# Patient Record
Sex: FEMALE | Race: WHITE | Employment: FULL TIME | URBAN - NONMETROPOLITAN AREA
[De-identification: names, ages, dates, MRNs, and addresses within clinical notes are randomized per-mention and may not be internally consistent; named-entity substitution may affect disease eponyms.]

---

## 2017-01-02 ENCOUNTER — HISTORY (OUTPATIENT)
Dept: FAMILY MEDICINE | Facility: OTHER | Age: 32
End: 2017-01-02

## 2017-01-02 ENCOUNTER — OFFICE VISIT - GICH (OUTPATIENT)
Dept: FAMILY MEDICINE | Facility: OTHER | Age: 32
End: 2017-01-02

## 2017-01-02 DIAGNOSIS — J06.9 ACUTE UPPER RESPIRATORY INFECTION: ICD-10-CM

## 2017-01-02 DIAGNOSIS — J30.89 OTHER ALLERGIC RHINITIS: ICD-10-CM

## 2017-01-02 DIAGNOSIS — B97.89 OTHER VIRAL AGENTS AS THE CAUSE OF DISEASES CLASSIFIED ELSEWHERE: ICD-10-CM

## 2017-01-02 DIAGNOSIS — R51.9 HEADACHE: ICD-10-CM

## 2017-01-02 DIAGNOSIS — J02.9 ACUTE PHARYNGITIS: ICD-10-CM

## 2017-01-02 DIAGNOSIS — H93.8X3 OTHER SPECIFIED DISORDERS OF EAR, BILATERAL: ICD-10-CM

## 2017-01-02 ASSESSMENT — PATIENT HEALTH QUESTIONNAIRE - PHQ9: SUM OF ALL RESPONSES TO PHQ QUESTIONS 1-9: 11

## 2017-03-10 ENCOUNTER — HISTORY (OUTPATIENT)
Dept: FAMILY MEDICINE | Facility: OTHER | Age: 32
End: 2017-03-10

## 2017-03-10 ENCOUNTER — OFFICE VISIT - GICH (OUTPATIENT)
Dept: FAMILY MEDICINE | Facility: OTHER | Age: 32
End: 2017-03-10

## 2017-03-10 DIAGNOSIS — B97.89 OTHER VIRAL AGENTS AS THE CAUSE OF DISEASES CLASSIFIED ELSEWHERE: ICD-10-CM

## 2017-03-10 DIAGNOSIS — J98.8 OTHER SPECIFIED RESPIRATORY DISORDERS: ICD-10-CM

## 2018-01-02 NOTE — PATIENT INSTRUCTIONS
Patient Information     Patient Name MRN Sex Bean Maldonado [id number] Female 1985      Patient Instructions by Jeannette Jackson NP at 2017 10:15 AM     Author:  Jeannette Jackson NP Service:  (none) Author Type:  PHYS- Nurse Practitioner     Filed:  2017 10:45 AM Encounter Date:  2017 Status:  Signed     :  Jeannette Jackson NP (PHYS- Nurse Practitioner)            Toradol injection administered in clinic for pain    Decadron given in clinic for swelling, pain    Continue to use Albuterol inhaler as needed    Renewed Singulair    Robitussin with codeine twice daily as needed for cough    Tessalon up to 3 times per day as needed for cough    Symptoms likely due to virus. No antibiotic is needed at this time.     Symptoms typically worse on days 3-4 and then begin improving each day. If symptoms begin worsening or fail to improve after 10 - 14 days, return to clinic for reevaluation.     Encouraged fluids and rest.    May use symptomatic care with tylenol or ibuprofen.     Using a humidifier works well to break up the congestion.     Elevate the mattress to 15 degrees in order to help with the congestion.    Frequent swallows of cool liquid.      Oatmeal or honey coats the throat and some patients find it soothes the pain.     Please call clinic with any questions or concerns.     Return to clinic with change/worsening of symptoms or concerns.

## 2018-01-02 NOTE — NURSING NOTE
Patient Information     Patient Name MRN Bean Shell [id number] Female 1985      Nursing Note by Wyatt Bae at 2017 10:15 AM     Author:  yWatt Bae Service:  (none) Author Type:  (none)     Filed:  2017 10:20 AM Encounter Date:  2017 Status:  Signed     :  Wyatt Bae            Patient here with bad cough.  Coughs so hard she'll vomit.  Also headache and sore throat from coughing.  Wyatt Bae CMA..............2017   10:16 AM

## 2018-01-02 NOTE — PROGRESS NOTES
Patient Information     Patient Name MRN Sex Bena Maldonado [id number] Female 1985      Progress Notes by Jeannette Jackson NP at 2017 10:15 AM     Author:  Jeannette Jackson NP Service:  (none) Author Type:  PHYS- Nurse Practitioner     Filed:  2017 12:05 PM Encounter Date:  2017 Status:  Signed     :  Jeannette Jackson NP (PHYS- Nurse Practitioner)            HPI:    Bean Garcia is a 31 y.o. female who presents to clinic today for cough.  Cough for the past 6 days.  Coughing up phlegm, hard yellow.  Hard coughing spells.  Cough is day and night.  Mild chest congestion.  No chest tightness.  No shortness of breath.  Stuffy nose.  Minimal drainage.  Some post nasal drainage.  Sore throat for the past 6 days, worsening.  Painful to swallow and talk.  Constant dizziness for the past 6 days.  States it does not go away and worse with movements.  No falls.  No syncopal episodes.  Bilateral ears with pressure.  Persistent headaches for the past 6 days.  Headache pain is across the forehead, achy pain.  No fevers.  Chills and sweats today.  Mild body aches.  Appetite normal.  Energy decreased.  Taking OTC cough medication, Ibuprofen and using cough drops.  No Ibuprofen today.  Occasional use of Albuterol inhaler.            No past medical history on file.  No past surgical history on file.  Social History     Substance Use Topics       Smoking status: Never Smoker     Smokeless tobacco: Never Used     Alcohol use Not on file     Current Outpatient Prescriptions       Medication  Sig Dispense Refill     montelukast (SINGULAIR) 10 mg tablet        VENTOLIN HFA 90 mcg/actuation inhaler        No current facility-administered medications for this visit.      Medications have been reviewed by me and are current to the best of my knowledge and ability.    Allergies     Allergen  Reactions     Amoxicillin Rash and Edema       ROS:  Refer to HPI    Visit Vitals       /88     Pulse 84  "    Temp 98.5  F (36.9  C) (Tympanic)     Ht 1.651 m (5' 5\")     Wt 87.5 kg (193 lb)     LMP 12/15/2016 (Approximate)     BMI 32.12 kg/m2       EXAM:  General Appearance: Well appearing adult female, appropriate appearance for age. No acute distress  Head: normocephalic, atraumatic  Ears: Left TM with bony landmarks appreciated, no erythema, no effusion, no bulging, no purulence.  Right TM with bony landmarks appreciated, no erythema, no effusion, no bulging, no purulence.   Left auditory canal clear.  Right auditory canal clear.  Normal external ears, non tender.  Eyes: conjunctivae without erythema, no drainage.  PERRLA.  EOMs intact.    Orophayrnx: moist mucous membranes, posterior pharynx with mild erythema, tonsils without hypertrophy, no erythema, no exudates or petechiae, no post nasal drip seen.    No sinus pain upon palpation of the frontal, maxillary, or ethmoid sinuses  Neck: Mild tonsillar lymph node enlargement with tenderness to palpation  Respiratory: normal chest wall and respirations.  Normal effort.  Clear to auscultation bilaterally, no wheezes or rhonchi or congestion, occasional mild dry cough appreciated  Cardiac: RRR with no murmurs  Musculoskeletal:  No tenderness to palpation over cervical spine.  Normal ROM of neck.  Normal gait.    Neuro: CN 2-12 grossly tested and intact, Rhomberg negative, rapid alternating movements without deficits, heel on shin test without deficits, finger to nose test without deficits, gait smooth and coordinated and patient able to mount exam table without difficulties  Psychological: normal affect, alert and pleasant      ASSESSMENT/PLAN:    ICD-10-CM    1. Viral URI with cough J06.9 dexamethasone 10 mg inj for oral, topical or inhalation use (DECADRON)     B97.89 benzonatate (TESSALON) 100 mg capsule      codeine-guaiFENesin (ROBITUSSIN AC)  mg/5 mL liquid   2. Viral pharyngitis J02.9 dexamethasone 10 mg inj for oral, topical or inhalation use (DECADRON) "   3. Ear pressure, bilateral H93.8X3    4. Persistent headaches R51 dexamethasone 10 mg inj for oral, topical or inhalation use (DECADRON)      ketorolac 60 mg injection (TORADOL)   5. Environmental and seasonal allergies J30.89 montelukast (SINGULAIR) 10 mg tablet         Likely viral illness - No antibiotics indicated at this time  Decadron 10 mg oral x 1 administered in clinic for pharyngitis and headache  Toradol 60 mg IM injection x 1 administered in clinic for headache  Robitussin with codeine 10 ml BID PRN  Tessalon 100 mg TID PRN  Renewed Rx for Singulair 10 mg daily   Continue Albuterol inhaler PRN  Encouraged fluids  Symptomatic treatment - honey, lozenges, humidifier, salt water gargles, rest, etc   Tylenol or ibuprofen PRN  Follow up if symptoms persist or worsen or concerns          Patient Instructions   Toradol injection administered in clinic for pain    Decadron given in clinic for swelling, pain    Continue to use Albuterol inhaler as needed    Renewed Singulair    Robitussin with codeine twice daily as needed for cough    Tessalon up to 3 times per day as needed for cough    Symptoms likely due to virus. No antibiotic is needed at this time.     Symptoms typically worse on days 3-4 and then begin improving each day. If symptoms begin worsening or fail to improve after 10 - 14 days, return to clinic for reevaluation.     Encouraged fluids and rest.    May use symptomatic care with tylenol or ibuprofen.     Using a humidifier works well to break up the congestion.     Elevate the mattress to 15 degrees in order to help with the congestion.    Frequent swallows of cool liquid.      Oatmeal or honey coats the throat and some patients find it soothes the pain.     Please call clinic with any questions or concerns.     Return to clinic with change/worsening of symptoms or concerns.

## 2018-01-03 NOTE — NURSING NOTE
Patient Information     Patient Name MRN Bean Shell [id number] Female 1985      Nursing Note by Maribell Shepard at 3/10/2017  3:30 PM     Author:  Maribell Shepard Service:  (none) Author Type:  (none)     Filed:  3/10/2017  3:36 PM Encounter Date:  3/10/2017 Status:  Signed     :  Maribell Shepard            Patient is here for cough, states has been coughing phlegm since . Having sore throat, wheezing, headaches, and not feeling good.  Maribell Shepard LPN .............3/10/2017  3:27 PM

## 2018-01-03 NOTE — PROGRESS NOTES
Patient Information     Patient Name MRN Sex Bean Maldonado [id number] Female 1985      Progress Notes by Britt Yoder MD at 3/10/2017  3:30 PM     Author:  Britt Yoder MD Service:  (none) Author Type:  Physician     Filed:  3/10/2017  3:53 PM Encounter Date:  3/10/2017 Status:  Signed     :  Britt Yoder MD (Physician)            SUBJECTIVE:    Bean Garcia is a 31 y.o. female who presents for illness.     HPI Comments: Patient has been sick for the last 5 days.   Symptoms started with fevers/chills, congestion, coughing.   Does have a headache, sore throat.   Missed work the last 2 days, had the rest of the days off already.   Cough is making it difficult to sleep.       REVIEW OF SYSTEMS:  ROS See HPI, ROS otherwise negative.     OBJECTIVE:  /77  Pulse 87  Temp 99  F (37.2  C) (Oral)  Wt 132.5 kg (292 lb 3.2 oz)  LMP 2017 (Approximate)  SpO2 95%  BMI 48.62 kg/m2    EXAM:   Physical Exam   Constitutional: She is well-developed, well-nourished, and in no distress.   Eyes: Conjunctivae are normal.   Neck: Neck supple.   Cardiovascular: Normal rate and regular rhythm.    Pulmonary/Chest: Effort normal and breath sounds normal.   Abdominal: Soft. There is no tenderness.   Neurological: She is alert.   Skin: No rash noted.   Psychiatric: Mood normal.       ASSESSMENT/PLAN:    ICD-10-CM    1. Viral respiratory illness J98.8 codeine-guaiFENesin (ROBITUSSIN AC)  mg/5 mL liquid     B97.89         Plan:   Suspect viral illness. Recommend continued monitoring and supportive measures. Discussed the importance of hydration and reasons to come back for further evaluation.      Britt Yoder MD

## 2018-01-25 ENCOUNTER — DOCUMENTATION ONLY (OUTPATIENT)
Dept: FAMILY MEDICINE | Facility: OTHER | Age: 33
End: 2018-01-25

## 2018-01-25 RX ORDER — ALBUTEROL SULFATE 90 UG/1
AEROSOL, METERED RESPIRATORY (INHALATION)
COMMUNITY
Start: 2016-10-07 | End: 2018-03-02

## 2018-01-25 RX ORDER — CODEINE PHOSPHATE/GUAIFENESIN 10-100MG/5
10 LIQUID (ML) ORAL EVERY 4 HOURS PRN
COMMUNITY
Start: 2017-03-10 | End: 2018-03-02

## 2018-01-25 RX ORDER — BENZONATATE 100 MG/1
100 CAPSULE ORAL 3 TIMES DAILY PRN
COMMUNITY
Start: 2017-01-02 | End: 2018-03-02

## 2018-01-27 VITALS
SYSTOLIC BLOOD PRESSURE: 124 MMHG | WEIGHT: 193 LBS | TEMPERATURE: 99 F | DIASTOLIC BLOOD PRESSURE: 77 MMHG | HEIGHT: 65 IN | BODY MASS INDEX: 32.15 KG/M2 | OXYGEN SATURATION: 95 % | SYSTOLIC BLOOD PRESSURE: 123 MMHG | HEART RATE: 87 BPM | WEIGHT: 292.2 LBS | BODY MASS INDEX: 48.62 KG/M2 | HEART RATE: 84 BPM | DIASTOLIC BLOOD PRESSURE: 88 MMHG | TEMPERATURE: 98.5 F

## 2018-01-31 ASSESSMENT — PATIENT HEALTH QUESTIONNAIRE - PHQ9: SUM OF ALL RESPONSES TO PHQ QUESTIONS 1-9: 11

## 2018-03-02 ENCOUNTER — OFFICE VISIT (OUTPATIENT)
Dept: FAMILY MEDICINE | Facility: OTHER | Age: 33
End: 2018-03-02
Attending: FAMILY MEDICINE
Payer: COMMERCIAL

## 2018-03-02 VITALS
DIASTOLIC BLOOD PRESSURE: 70 MMHG | SYSTOLIC BLOOD PRESSURE: 114 MMHG | HEART RATE: 83 BPM | TEMPERATURE: 98.6 F | WEIGHT: 279 LBS | BODY MASS INDEX: 46.43 KG/M2

## 2018-03-02 DIAGNOSIS — J11.1 INFLUENZA: Primary | ICD-10-CM

## 2018-03-02 PROCEDURE — 99213 OFFICE O/P EST LOW 20 MIN: CPT | Performed by: FAMILY MEDICINE

## 2018-03-02 ASSESSMENT — PAIN SCALES - GENERAL: PAINLEVEL: NO PAIN (0)

## 2018-03-02 NOTE — LETTER
March 2, 2018      Bean Garcia  [address] [address] [address]        To Whom It May Concern:    Bean Garcia was seen in our clinic due to influenza. She may return to work on 3/6/2018 without restrictions.      Sincerely,        Lien Akins MD

## 2018-03-02 NOTE — MR AVS SNAPSHOT
"              After Visit Summary   3/2/2018    Bean Garcia    MRN: [id number]           Patient Information     Date Of Birth          1985        Visit Information        Provider Department      3/2/2018 2:45 PM Lien Sweeney MD [facility]        Today's Diagnoses     Influenza    -  1       Follow-ups after your visit        Who to contact     If you have questions or need follow up information about today's clinic visit or your schedule please contact [facility] directly at [phone].  Normal or non-critical lab and imaging results will be communicated to you by [employer], letter or phone within 4 business days after the clinic has received the results. If you do not hear from us within 7 days, please contact the clinic through [employer] or phone. If you have a critical or abnormal lab result, we will notify you by phone as soon as possible.  Submit refill requests through [employer] or call your pharmacy and they will forward the refill request to us. Please allow 3 business days for your refill to be completed.          Additional Information About Your Visit        MyChart Information     8aweek lets you send messages to your doctor, view your test results, renew your prescriptions, schedule appointments and more. To sign up, go to [email or web address] . Click on \"Log in\" on the left side of the screen, which will take you to the Welcome page. Then click on \"Sign up Now\" on the right side of the page.     You will be asked to enter the access code listed below, as well as some personal information. Please follow the directions to create your username and password.     Your access code is: [email or web address]  Expires: 2018  3:54 PM     Your access code will  in 90 days. If you need help or a new code, please call your [place] clinic or [phone].        Care EveryWhere ID     This is your Care EveryWhere ID. This could be used by " other organizations to access your [facility] medical records  [id number]        Your Vitals Were     Pulse Temperature Last Period BMI (Body Mass Index)          83 98.6  F (37  C) (Oral) 02/17/2018 (Approximate) 46.43 kg/m2         Blood Pressure from Last 3 Encounters:   03/02/18 114/70   03/10/17 123/77   01/02/17 124/88    Weight from Last 3 Encounters:   03/02/18 279 lb (126.6 kg)   03/10/17 292 lb 3.2 oz (132.5 kg)   01/02/17 193 lb (87.5 kg)              Today, you had the following     No orders found for display         Today's Medication Changes          These changes are accurate as of 3/2/18 11:59 PM.  If you have any questions, ask your nurse or doctor.               These medicines have changed or have updated prescriptions.        Dose/Directions    albuterol 108 (90 BASE) MCG/ACT Inhaler   Commonly known as:  PROAIR HFA/PROVENTIL HFA/VENTOLIN HFA   This may have changed:  Another medication with the same name was removed. Continue taking this medication, and follow the directions you see here.   Used for:  Acute bronchospasm, Environmental allergies   Changed by:  Lien Sweeney MD        Dose:  2 puff   Inhale 2 puffs into the lungs every 6 hours as needed for shortness of breath / dyspnea or wheezing   Quantity:  1 Inhaler   Refills:  1         Stop taking these medicines if you haven't already. Please contact your care team if you have questions.     benzonatate 100 MG capsule   Commonly known as:  TESSALON   Stopped by:  Lien Sweeney MD           guaiFENesin-codeine 100-10 MG/5ML Syrp syrup   Commonly known as:  guaiFENesin AC   Stopped by:  Lien Sweeney MD           montelukast 10 MG tablet   Commonly known as:  SINGULAIR   Stopped by:  Lien Sweeney MD                    Primary Care Provider Fax #    Physician No Ref-Primary [phone]       No address on file        Equal Access to Services     MARCOS PATINO AH: Girish adorno  yuni Sullivan, isabel moebaironha, cahnda karosa maria viniciusbora, dominique kennain hayaachris colvinkomal connorjohn lamischris key. So [facility] [phone].    ATENCIÓN: Si habla español, tiene a mercer disposición servicios gratuitos de asistencia lingüística. [place] al [phone].    We comply with applicable federal civil rights laws and [place] laws. We do not discriminate on the basis of race, color, national origin, age, disability, sex, sexual orientation, or gender identity.            Thank you!     Thank you for choosing [facility] AND [facility]\""  for your care. Our goal is always to provide you with excellent care. Hearing back from our patients is one way we can continue to improve our services. Please take a few minutes to complete the written survey that you may receive in the mail after your visit with us. Thank you!             Your Updated Medication List - Protect others around you: Learn how to safely use, store and throw away your medicines at www.disposemymeds.org.          This list is accurate as of 3/2/18 11:59 PM.  Always use your most recent med list.                   Brand Name Dispense Instructions for use Diagnosis    albuterol 108 (90 BASE) MCG/ACT Inhaler    PROAIR HFA/PROVENTIL HFA/VENTOLIN HFA    1 Inhaler    Inhale 2 puffs into the lungs every 6 hours as needed for shortness of breath / dyspnea or wheezing    Acute bronchospasm, Environmental allergies

## 2018-03-02 NOTE — NURSING NOTE
Patient is here for flu and vomiting. Started Wednesday, coughing, sneezing, not eating much, yesterday vomiting and fever, and today diarrhea.   Maribell Shepard LPN .............3/2/90501:48 PM

## 2018-03-08 NOTE — PROGRESS NOTES
SUBJECTIVE:   Bean Garcia is a 32 year old female who presents to clinic today for the following health issues:  Nursing Notes:   Maribell Shepard LPN  3/2/2018  3:02 PM  Signed  Patient is here for flu and vomiting. Started Wednesday, coughing, sneezing, not eating much, yesterday vomiting and fever, and today diarrhea.   Maribell Pastorley LPN .............3/2/49055:48 PM      HPI    32-year-old female presents with nausea, vomiting, nonproductive cough and fever.  She denies any known exposure to influenza.  She did not get a flu shot this year.  She has no history of underlying lung disease.  Symptoms have been present for over 4 days.  Overall she feels a bit better today than she did yesterday.  There are no active problems to display for this patient.    History reviewed. No pertinent surgical history.    Review of Systems     OBJECTIVE:     /70  Pulse 83  Temp 98.6  F (37  C) (Oral)  Wt 279 lb (126.6 kg)  LMP 02/17/2018 (Approximate)  BMI 46.43 kg/m2  Body mass index is 46.43 kg/(m^2).  Physical Exam   Constitutional: She appears well-developed. No distress.   HENT:   Nose: Nose normal.   Neck: No thyromegaly present.   Cardiovascular: Normal rate.    No murmur heard.  Pulmonary/Chest: Effort normal and breath sounds normal. No respiratory distress.   Abdominal: Soft. She exhibits no distension. There is no tenderness. There is no rebound and no guarding.   Musculoskeletal: She exhibits no edema.   Lymphadenopathy:     She has no cervical adenopathy.   Skin: No rash noted.       Diagnostic Test Results:      ASSESSMENT/PLAN:     (J11.1) Influenza  (primary encounter diagnosis)  Comment:   Plan:  Suspect influenza.  Discussed supportive cares.  She is too far into the illness to qualify for Tamiflu.  [place] diet progress as tolerated.  Return if increased respiratory symptoms or inability to keep fluids and solids down.    There are no Patient Instructions on file for this visit.        Lien  MD Tashi  [facility]

## 2018-07-24 NOTE — PROGRESS NOTES
Patient Information     Patient Name  Bean Garcia MRN  [id number] Sex  Female   1985      Letter by Britt Yoder MD at      Author:  Britt Yoder MD Service:  (none) Author Type:  (none)    Filed:   Encounter Date:  3/10/2017 Status:  (Other)         Re: Bean Garcia  1985       March 10, 2017    To Whom it May Concern:    Patient was seen in clinic on 3/10/2017.    Please excuse from any missed work due to contagious illness.     Please don't hesitate to call if you have concerns or questions.       Sincerely,       BRITT YODER MD

## 2018-10-02 ENCOUNTER — OFFICE VISIT (OUTPATIENT)
Dept: FAMILY MEDICINE | Facility: OTHER | Age: 33
End: 2018-10-02
Attending: FAMILY MEDICINE
Payer: COMMERCIAL

## 2018-10-02 VITALS
DIASTOLIC BLOOD PRESSURE: 76 MMHG | HEIGHT: 66 IN | WEIGHT: 286.2 LBS | HEART RATE: 74 BPM | BODY MASS INDEX: 46 KG/M2 | TEMPERATURE: 97.4 F | SYSTOLIC BLOOD PRESSURE: 138 MMHG

## 2018-10-02 DIAGNOSIS — Z23 ENCOUNTER FOR IMMUNIZATION: Primary | ICD-10-CM

## 2018-10-02 DIAGNOSIS — S66.912A WRIST STRAIN, LEFT, INITIAL ENCOUNTER: ICD-10-CM

## 2018-10-02 PROCEDURE — 99213 OFFICE O/P EST LOW 20 MIN: CPT | Mod: 25 | Performed by: FAMILY MEDICINE

## 2018-10-02 PROCEDURE — 90686 IIV4 VACC NO PRSV 0.5 ML IM: CPT | Performed by: FAMILY MEDICINE

## 2018-10-02 PROCEDURE — 90471 IMMUNIZATION ADMIN: CPT | Performed by: FAMILY MEDICINE

## 2018-10-02 ASSESSMENT — PAIN SCALES - GENERAL: PAINLEVEL: MILD PAIN (3)

## 2018-10-02 NOTE — PROGRESS NOTES
"Nursing Notes:   Michelle Watters LPN  10/2/2018  9:34 AM  Unsigned  Patient presents to clinic with left wrist pain that started September 27th. No known injury  Michelle Tam ....................  10/2/2018   9:34 AM      SUBJECTIVE:  Bean Garcia is a 33 year old female who presents with her mother comes in with about 4-5 days of pain in left wrist without injury. Right hand dominant. Awoke with the pain on morning of 9/27/2018. Has tried ice and tylenol. She does relate doing some work with her father and ran a geovanna hammer several times on the day before that weighed about 90#.  She did this several times throughout the day for short intervals and it was very heavy and difficult to manage.    OBJECTIVE:  /76  Pulse 74  Temp 97.4  F (36.3  C)  Ht 5' 6\" (1.676 m)  Wt 286 lb 3.2 oz (129.8 kg)  LMP 09/27/2018  Breastfeeding? No  BMI 46.19 kg/m2     Appearance: in no apparent distress.  Wrist exam: soft tissue tenderness dorsal left wrist without edema or crepitus and decreased ROM with rotation at left wrist but fine with flexion and extension.  There is no warmth redness or inflammation visible in the wrist area.  X-ray: not indicated.    ASSESSMENT:  Wrist strain    PLAN:  Management discussed.  Topical such as icy hot or Biofreeze, sleeve like Ace wrap if needed OTC and she could get that at [facility].  May continue to use ice and try an anti-inflammatory.  Give this more time to heal.  Follow-up if not improving.  Influence of vaccine requested and given.  Consider health maintenance exam.  Jessica Aguilar MD  10:12 AM 10/2/2018   Portions of this dictation were created using the Dragon Nuance voice recognition system. Proofreading was completed but there may be errors in text.    "

## 2018-10-02 NOTE — PATIENT INSTRUCTIONS
Wrist Sprain  A sprain is an injury to the ligaments or capsule that holds a joint together. There are no broken bones. Most sprains take about 3 to 6 weeks to heal. If it a severe sprain where the ligament is completely torn, it can take months to recover.     Most wrist sprains are treated with a splint, wrist brace, or elastic wrap for support. Severe sprains may require surgery.  Home care    Keep your arm elevated to reduce pain and swelling. This is very important during the first 48 hours.    Apply an ice pack over the injured area for 15 to 20 minutes every 3 to 6 hours. You should do this for the first 24 to 48 hours. You can make an ice pack by filling a plastic bag that seals at the top with ice cubes and then wrapping it with a thin towel. Continue to use ice packs for relief of pain and swelling as needed. As the ice melts, be careful to avoid getting your wrap, splint, or cast wet. After 48 hours, apply heat (warm shower or warm bath) for 15 to 20 minutes several times a day, or alternate ice and heat.     You may use over-the-counter pain medicine to control pain, unless another pain medicine was prescribed. If you have chronic liver or kidney disease or ever had a stomach ulcer or GI bleeding, talk with your doctor before using these medicines.    If you were given a splint or brace, wear it for the time advised by your doctor.  Follow-up care  Follow up with your healthcare provider as advised. Any X-rays you had today don t show any broken bones, breaks, or fractures. Sometimes fractures don t show up on the first X-ray. Bruises and sprains can sometimes hurt as much as a fracture. These injuries can take time to heal completely. If your symptoms don t improve or they get worse, talk with your doctor. You may need a repeat X-ray. If X-rays were taken, you will be told of any new findings that may affect your care.  When to seek medical advice  Call your healthcare provider right away if any of  these occur:    Pain or swelling increases    Fingers or hand becomes cold, blue, numb, or tingly  Date Last Reviewed: 11/20/2015 2000-2017 [employer]. [address] [place] [address], [place] [address]. All rights reserved. This information is not intended as a substitute for professional medical care. Always follow your healthcare professional's instructions.

## 2018-10-02 NOTE — MR AVS SNAPSHOT
After Visit Summary   10/2/2018    Bean Garcia    MRN: [id number]           Patient Information     Date Of Birth          1985        Visit Information        Provider Department      10/2/2018 9:30 AM Jessica Aguilar MD [facility]        Today's Diagnoses     Encounter for immunization    -  1      Care Instructions      Wrist Sprain  A sprain is an injury to the ligaments or capsule that holds a joint together. There are no broken bones. Most sprains take about 3 to 6 weeks to heal. If it a severe sprain where the ligament is completely torn, it can take months to recover.     Most wrist sprains are treated with a splint, wrist brace, or elastic wrap for support. Severe sprains may require surgery.  Home care    Keep your arm elevated to reduce pain and swelling. This is very important during the first 48 hours.    Apply an ice pack over the injured area for 15 to 20 minutes every 3 to 6 hours. You should do this for the first 24 to 48 hours. You can make an ice pack by filling a plastic bag that seals at the top with ice cubes and then wrapping it with a thin towel. Continue to use ice packs for relief of pain and swelling as needed. As the ice melts, be careful to avoid getting your wrap, splint, or cast wet. After 48 hours, apply heat (warm shower or warm bath) for 15 to 20 minutes several times a day, or alternate ice and heat.     You may use over-the-counter pain medicine to control pain, unless another pain medicine was prescribed. If you have chronic liver or kidney disease or ever had a stomach ulcer or GI bleeding, talk with your doctor before using these medicines.    If you were given a splint or brace, wear it for the time advised by your doctor.  Follow-up care  Follow up with your healthcare provider as advised. Any X-rays you had today don t show any broken bones, breaks, or fractures. Sometimes fractures don t show up on the first X-ray. Bruises and  "sprains can sometimes hurt as much as a fracture. These injuries can take time to heal completely. If your symptoms don t improve or they get worse, talk with your doctor. You may need a repeat X-ray. If X-rays were taken, you will be told of any new findings that may affect your care.  When to seek medical advice  Call your healthcare provider right away if any of these occur:    Pain or swelling increases    Fingers or hand becomes cold, blue, numb, or tingly  Date Last Reviewed: 11/20/2015 2000-2017 [employer]. [address] [place] [id number]. All rights reserved. This information is not intended as a substitute for professional medical care. Always follow your healthcare professional's instructions.                Follow-ups after your visit        Who to contact     If you have questions or need follow up information about today's clinic visit or your schedule please contact [facility] directly at [phone].  Normal or non-critical lab and imaging results will be communicated to you by MyChart, letter or phone within 4 business days after the clinic has received the results. If you do not hear from us within 7 days, please contact the clinic through MyChart or phone. If you have a critical or abnormal lab result, we will notify you by phone as soon as possible.  Submit refill requests through [employer] or call your pharmacy and they will forward the refill request to us. Please allow 3 business days for your refill to be completed.          Additional Information About Your Visit        Care EveryWhere ID     This is your Care EveryWhere ID. This could be used by other organizations to access your [facility] medical records  [id number]        Your Vitals Were     Pulse Temperature Height Last Period Breastfeeding? BMI (Body Mass Index)    74 97.4  F (36.3  C) 5' 6\" (1.676 m) 09/27/2018 No 46.19 kg/m2       Blood Pressure from Last 3 Encounters:   10/02/18 " 138/76   03/02/18 114/70   03/10/17 123/77    Weight from Last 3 Encounters:   10/02/18 286 lb 3.2 oz (129.8 kg)   03/02/18 279 lb (126.6 kg)   03/10/17 292 lb 3.2 oz (132.5 kg)              We Performed the Following     GH IMM-  HC FLU VAC PRESRV FREE QUAD SPLIT VIR 3+YRS IM        Primary Care Provider Fax #    Physician No Ref-Primary [phone]       No address on file        Equal Access to Services     MARCOS PATINO : Hadii aad ku hadasho Soomaali, waaxda luqadaha, qaybta kaalmada adeegyada, dominique lorenzo . So [facility] [phone].    ATENCIÓN: Si habla español, tiene a mercer disposición servicios gratuitos de asistencia lingüística. Llame al [phone].    We comply with applicable federal civil rights laws and [place] laws. We do not discriminate on the basis of race, color, national origin, age, disability, sex, sexual orientation, or gender identity.            Thank you!     Thank you for choosing [facility] [place] [facility]  for your care. Our goal is always to provide you with excellent care. Hearing back from our patients is one way we can continue to improve our services. Please take a few minutes to complete the written survey that you may receive in the mail after your visit with us. Thank you!             Your Updated Medication List - Protect others around you: Learn how to safely use, store and throw away your medicines at www.disposemymeds.org.          This list is accurate as of 10/2/18  9:53 AM.  Always use your most recent med list.                   Brand Name Dispense Instructions for use Diagnosis    albuterol 108 (90 Base) MCG/ACT inhaler    PROAIR HFA/PROVENTIL HFA/VENTOLIN HFA    1 Inhaler    Inhale 2 puffs into the lungs every 6 hours as needed for shortness of breath / dyspnea or wheezing    Acute bronchospasm, Environmental allergies

## 2018-10-02 NOTE — NURSING NOTE
Patient presents to clinic with left wrist pain that started September 27th. No known injury  Michelle Tam ....................  10/2/2018   9:34 AM

## 2019-12-27 ENCOUNTER — HOSPITAL ENCOUNTER (OUTPATIENT)
Dept: GENERAL RADIOLOGY | Facility: OTHER | Age: 34
Discharge: HOME | End: 2019-12-27
Attending: NURSE PRACTITIONER | Admitting: NURSE PRACTITIONER
Payer: COMMERCIAL

## 2019-12-27 ENCOUNTER — OFFICE VISIT (OUTPATIENT)
Dept: FAMILY MEDICINE | Facility: OTHER | Age: 34
End: 2019-12-27
Attending: NURSE PRACTITIONER
Payer: COMMERCIAL

## 2019-12-27 VITALS
SYSTOLIC BLOOD PRESSURE: 122 MMHG | TEMPERATURE: 100 F | WEIGHT: 288 LBS | HEART RATE: 104 BPM | RESPIRATION RATE: 18 BRPM | BODY MASS INDEX: 46.48 KG/M2 | OXYGEN SATURATION: 94 % | DIASTOLIC BLOOD PRESSURE: 70 MMHG

## 2019-12-27 DIAGNOSIS — J98.01 ACUTE BRONCHOSPASM: ICD-10-CM

## 2019-12-27 DIAGNOSIS — R05.9 COUGH: Primary | ICD-10-CM

## 2019-12-27 DIAGNOSIS — R05.9 COUGH: ICD-10-CM

## 2019-12-27 DIAGNOSIS — J18.9 PNEUMONIA OF LEFT LOWER LOBE DUE TO INFECTIOUS ORGANISM: ICD-10-CM

## 2019-12-27 LAB
FLUAV+FLUBV RNA SPEC QL NAA+PROBE: NEGATIVE
FLUAV+FLUBV RNA SPEC QL NAA+PROBE: NEGATIVE
RSV RNA SPEC NAA+PROBE: NEGATIVE
SPECIMEN SOURCE: NORMAL

## 2019-12-27 PROCEDURE — 87631 RESP VIRUS 3-5 TARGETS: CPT | Mod: ZL | Performed by: NURSE PRACTITIONER

## 2019-12-27 PROCEDURE — 99214 OFFICE O/P EST MOD 30 MIN: CPT | Performed by: NURSE PRACTITIONER

## 2019-12-27 PROCEDURE — 71046 X-RAY EXAM CHEST 2 VIEWS: CPT

## 2019-12-27 RX ORDER — ALBUTEROL SULFATE 90 UG/1
2 AEROSOL, METERED RESPIRATORY (INHALATION) EVERY 6 HOURS PRN
Qty: 1 INHALER | Refills: 1 | Status: SHIPPED | OUTPATIENT
Start: 2019-12-27 | End: 2021-10-07

## 2019-12-27 RX ORDER — AZITHROMYCIN 250 MG/1
TABLET, FILM COATED ORAL
Qty: 6 TABLET | Refills: 0 | Status: SHIPPED | OUTPATIENT
Start: 2019-12-27 | End: 2020-01-01

## 2019-12-27 RX ORDER — AZITHROMYCIN 250 MG/1
TABLET, FILM COATED ORAL
Qty: 6 TABLET | Refills: 0 | Status: CANCELLED | OUTPATIENT
Start: 2019-12-27 | End: 2020-01-01

## 2019-12-27 RX ORDER — ALBUTEROL SULFATE 90 UG/1
2 AEROSOL, METERED RESPIRATORY (INHALATION) EVERY 6 HOURS PRN
Qty: 1 INHALER | Refills: 1 | Status: SHIPPED | OUTPATIENT
Start: 2019-12-27 | End: 2019-12-27

## 2019-12-27 ASSESSMENT — ENCOUNTER SYMPTOMS
SORE THROAT: 1
FEVER: 1
CHILLS: 1
SINUS PRESSURE: 0
CHEST TIGHTNESS: 1
ABDOMINAL PAIN: 0
SHORTNESS OF BREATH: 0
COUGH: 1
RHINORRHEA: 0
SINUS PAIN: 0
WHEEZING: 1
ACTIVITY CHANGE: 1
NAUSEA: 0

## 2019-12-27 ASSESSMENT — PATIENT HEALTH QUESTIONNAIRE - PHQ9: SUM OF ALL RESPONSES TO PHQ QUESTIONS 1-9: 0

## 2019-12-27 NOTE — LETTER
[facility]  [address]  [place] [address]  [phone]      December 27, 2019      RE: Bean Garcia  [facility] [address] [facility] [address]       To whom it may concern:    Bean Garcia was seen in our clinic today. She has a respiratory infection. Please excuse her from work for 12/27-12/30    Sincerely,      Caridad KRISHNA

## 2019-12-27 NOTE — NURSING NOTE
Chief Complaint   Patient presents with     Fever     Ear Problem     Cough         Medication Reconciliation: complete    Trisha Suarez, LPN

## 2019-12-27 NOTE — PROGRESS NOTES
SUBJECTIVE:   Bean Garcia is a 34 year old female who presents to clinic today for the following health issues:    HPI  Patient presents for evaluation of throat pain, bilateral ear pain and cough. Reports non-productive cough and wheezing. Symptoms started on 12/19 with sore throat and then increased in severity. She feels worse today. No sick contacts. No asthma history. Non-smoker.   She has treated with tylenol for the fevers. She had flu shot this year. No SOB, but notes wheezing.     There are no active problems to display for this patient.    No past medical history on file.   No past surgical history on file.    Review of Systems   Constitutional: Positive for activity change, chills and fever.   HENT: Positive for congestion, ear pain and sore throat. Negative for postnasal drip, rhinorrhea, sinus pressure and sinus pain.    Respiratory: Positive for cough, chest tightness and wheezing. Negative for shortness of breath.    Gastrointestinal: Negative for abdominal pain and nausea.        OBJECTIVE:     /70 (BP Location: Right arm, Patient Position: Sitting, Cuff Size: Adult Large)   Pulse 104   Temp 100  F (37.8  C)   Resp 18   Wt 130.6 kg (288 lb)   SpO2 94%   BMI 46.48 kg/m    Body mass index is 46.48 kg/m .  Physical Exam  Constitutional:       Appearance: She is ill-appearing.   HENT:      Head: Normocephalic.      Right Ear: Tympanic membrane normal.      Left Ear: Tympanic membrane normal.      Nose: Nose normal.      Mouth/Throat:      Mouth: Mucous membranes are moist.      Pharynx: No oropharyngeal exudate or posterior oropharyngeal erythema.   Eyes:      Conjunctiva/sclera: Conjunctivae normal.   Cardiovascular:      Rate and Rhythm: Normal rate and regular rhythm.   Pulmonary:      Effort: No respiratory distress.      Breath sounds: Wheezing present.   Lymphadenopathy:      Cervical: No cervical adenopathy.   Skin:     Coloration: Skin is pale.   Neurological:      Mental Status:  She is alert.         Diagnostic Test Results:  Results for orders placed or performed in visit on 12/27/19 (from the past 24 hour(s))   Influenza A and B and RSV PCR   Result Value Ref Range    Specimen Description Nasal     Influenza A PCR Negative NEG^Negative    Influenza B PCR Negative NEG^Negative    Resp Syncytial Virus Negative NEG^Negative   PROCEDURE:  XR CHEST 2 VW     HISTORY: cough for 1 week, fevers, wheezing; Cough, .     COMPARISON:  None.     FINDINGS:  The cardiomediastinal contours are normal.  The trachea is midline.  No focal consolidation, effusion or pneumothorax.    No suspicious osseous lesion or subdiaphragmatic free air.                                                                      IMPRESSION:       No discrete consolidation.      ASSESSMENT/PLAN:   1. Cough  Influenza negative.   - XR Chest 2 Views; Future  - Influenza A and B and RSV PCR  - albuterol (PROAIR HFA/PROVENTIL HFA/VENTOLIN HFA) 108 (90 Base) MCG/ACT inhaler; Inhale 2 puffs into the lungs every 6 hours as needed for shortness of breath / dyspnea or wheezing  Dispense: 1 Inhaler; Refill: 1    2. Acute bronchospasm  For wheezing encouraged use of albuterol inhaler.   - albuterol (PROAIR HFA/PROVENTIL HFA/VENTOLIN HFA) 108 (90 Base) MCG/ACT inhaler; Inhale 2 puffs into the lungs every 6 hours as needed for shortness of breath / dyspnea or wheezing  Dispense: 1 Inhaler; Refill: 1    3. Pneumonia of left lower lobe due to infectious organism (H)  I suspect pneumonia. There is some consolidation noted in LLL on imaging. We will treat with antibiotics. Encouraged rest and increase hydration. Work note provided for this weekend. We discussed when to be re-evaluated for worsening symptoms. Mother and patient are in agreement with this plan.   - azithromycin (ZITHROMAX) 250 MG tablet; Take 2 tablets (500 mg) by mouth daily for 1 day, THEN 1 tablet (250 mg) daily for 4 days.  Dispense: 6 tablet; Refill: 0    Caridad Asencio  FNP-[facility]

## 2020-09-12 ENCOUNTER — APPOINTMENT (OUTPATIENT)
Dept: CT IMAGING | Facility: OTHER | Age: 35
End: 2020-09-12
Attending: PHYSICIAN ASSISTANT
Payer: COMMERCIAL

## 2020-09-12 ENCOUNTER — HOSPITAL ENCOUNTER (EMERGENCY)
Facility: OTHER | Age: 35
Discharge: HOME | End: 2020-09-12
Attending: PHYSICIAN ASSISTANT | Admitting: PHYSICIAN ASSISTANT
Payer: COMMERCIAL

## 2020-09-12 VITALS
DIASTOLIC BLOOD PRESSURE: 76 MMHG | HEIGHT: 65 IN | HEART RATE: 87 BPM | SYSTOLIC BLOOD PRESSURE: 134 MMHG | OXYGEN SATURATION: 98 % | RESPIRATION RATE: 16 BRPM | BODY MASS INDEX: 48.82 KG/M2 | TEMPERATURE: 97.4 F | WEIGHT: 293 LBS

## 2020-09-12 DIAGNOSIS — R91.8 LUNG NODULES: ICD-10-CM

## 2020-09-12 DIAGNOSIS — S39.012A STRAIN OF LUMBAR REGION, INITIAL ENCOUNTER: ICD-10-CM
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
## 2020-09-12 DIAGNOSIS — S70.01XA CONTUSION OF RIGHT HIP, INITIAL ENCOUNTER: ICD-10-CM

## 2020-09-12 DIAGNOSIS — S20.219A CHEST WALL CONTUSION, UNSPECIFIED LATERALITY, INITIAL ENCOUNTER: ICD-10-CM

## 2020-09-12 PROCEDURE — 71250 CT THORAX DX C-: CPT

## 2020-09-12 PROCEDURE — 72128 CT CHEST SPINE W/O DYE: CPT

## 2020-09-12 PROCEDURE — 25000128 H RX IP 250 OP 636: Performed by: PHYSICIAN ASSISTANT

## 2020-09-12 PROCEDURE — 72131 CT LUMBAR SPINE W/O DYE: CPT

## 2020-09-12 PROCEDURE — 99284 EMERGENCY DEPT VISIT MOD MDM: CPT | Mod: Z6 | Performed by: PHYSICIAN ASSISTANT

## 2020-09-12 PROCEDURE — 96372 THER/PROPH/DIAG INJ SC/IM: CPT | Performed by: PHYSICIAN ASSISTANT

## 2020-09-12 PROCEDURE — 99284 EMERGENCY DEPT VISIT MOD MDM: CPT | Mod: 25 | Performed by: PHYSICIAN ASSISTANT

## 2020-09-12 RX ORDER — CYCLOBENZAPRINE HCL 10 MG
10 TABLET ORAL 3 TIMES DAILY PRN
Qty: 30 TABLET | Refills: 0 | Status: SHIPPED | OUTPATIENT
Start: 2020-09-12 | End: 2020-11-03

## 2020-09-12 RX ORDER — KETOROLAC TROMETHAMINE 30 MG/ML
60 INJECTION, SOLUTION INTRAMUSCULAR; INTRAVENOUS ONCE
Status: COMPLETED | OUTPATIENT
Start: 2020-09-12 | End: 2020-09-12

## 2020-09-12 RX ORDER — HYDROCODONE BITARTRATE AND ACETAMINOPHEN 5; 325 MG/1; MG/1
1 TABLET ORAL EVERY 6 HOURS PRN
Qty: 15 TABLET | Refills: 0 | Status: SHIPPED | OUTPATIENT
Start: 2020-09-12 | End: 2020-11-03

## 2020-09-12 RX ORDER — FENTANYL CITRATE 50 UG/ML
100 INJECTION, SOLUTION INTRAMUSCULAR; INTRAVENOUS ONCE
Status: COMPLETED | OUTPATIENT
Start: 2020-09-12 | End: 2020-09-12

## 2020-09-12 RX ORDER — IBUPROFEN 800 MG/1
800 TABLET, FILM COATED ORAL EVERY 8 HOURS PRN
Qty: 60 TABLET | Refills: 0 | Status: SHIPPED | OUTPATIENT
Start: 2020-09-12 | End: 2020-11-03

## 2020-09-12 RX ADMIN — KETOROLAC TROMETHAMINE 60 MG: 30 INJECTION, SOLUTION INTRAMUSCULAR at 14:56

## 2020-09-12 RX ADMIN — FENTANYL CITRATE 100 MCG: 50 INJECTION, SOLUTION INTRAMUSCULAR; INTRAVENOUS at 14:56

## 2020-09-12 ASSESSMENT — ENCOUNTER SYMPTOMS
DYSURIA: 0
SORE THROAT: 0
COLOR CHANGE: 0
VOMITING: 0
CONSTIPATION: 0
SHORTNESS OF BREATH: 0
COUGH: 0
FREQUENCY: 0
ABDOMINAL PAIN: 0
EYE PAIN: 0
NECK STIFFNESS: 0
DIZZINESS: 0
WHEEZING: 0
DIARRHEA: 0
NECK PAIN: 0
APPETITE CHANGE: 0
CHEST TIGHTNESS: 0
FATIGUE: 0
NAUSEA: 0
FACIAL SWELLING: 0
STRIDOR: 0
BACK PAIN: 1
SPEECH DIFFICULTY: 0
TREMORS: 0
RHINORRHEA: 0
FACIAL ASYMMETRY: 0
HEADACHES: 0
WEAKNESS: 0
FEVER: 0
SEIZURES: 0
TROUBLE SWALLOWING: 0
LIGHT-HEADEDNESS: 0
ACTIVITY CHANGE: 0

## 2020-09-12 ASSESSMENT — MIFFLIN-ST. JEOR: SCORE: 2039.46
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
## 2020-09-12 NOTE — ED AVS SNAPSHOT
[facility]  [address]  [place] [address]  Phone:  [phone]  Fax:  [phone]                                    Bean Garcia   MRN: [id number]    Department:  [facility]   Date of Visit:  9/12/2020           After Visit Summary Signature Page    I have received my discharge instructions, and my questions have been answered. I have discussed any challenges I see with this plan with the nurse or doctor.    ..........................................................................................................................................  Patient/Patient Representative Signature      ..........................................................................................................................................  Patient Representative Print Name and Relationship to Patient    ..................................................               ................................................  Date                                   Time    ..........................................................................................................................................  Reviewed by Signature/Title    ...................................................              ..............................................  Date                                               Time          22EPIC Rev 08/18
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
## 2020-10-03 ENCOUNTER — E-VISIT (OUTPATIENT)
Dept: FAMILY MEDICINE | Facility: OTHER | Age: 35
End: 2020-10-03
Payer: COMMERCIAL

## 2020-10-03 DIAGNOSIS — Z53.9 ERRONEOUS ENCOUNTER--DISREGARD: Primary | ICD-10-CM

## 2020-10-03 ASSESSMENT — ANXIETY QUESTIONNAIRES
3. WORRYING TOO MUCH ABOUT DIFFERENT THINGS: NEARLY EVERY DAY
6. BECOMING EASILY ANNOYED OR IRRITABLE: NEARLY EVERY DAY
GAD7 TOTAL SCORE: 20
2. NOT BEING ABLE TO STOP OR CONTROL WORRYING: NEARLY EVERY DAY
GAD7 TOTAL SCORE: 20
5. BEING SO RESTLESS THAT IT IS HARD TO SIT STILL: MORE THAN HALF THE DAYS
7. FEELING AFRAID AS IF SOMETHING AWFUL MIGHT HAPPEN: NEARLY EVERY DAY
1. FEELING NERVOUS, ANXIOUS, OR ON EDGE: NEARLY EVERY DAY
4. TROUBLE RELAXING: NEARLY EVERY DAY
GAD7 TOTAL SCORE: 20
7. FEELING AFRAID AS IF SOMETHING AWFUL MIGHT HAPPEN: NEARLY EVERY DAY

## 2020-10-03 ASSESSMENT — PATIENT HEALTH QUESTIONNAIRE - PHQ9
10. IF YOU CHECKED OFF ANY PROBLEMS, HOW DIFFICULT HAVE THESE PROBLEMS MADE IT FOR YOU TO DO YOUR WORK, TAKE CARE OF THINGS AT HOME, OR GET ALONG WITH OTHER PEOPLE: EXTREMELY DIFFICULT
SUM OF ALL RESPONSES TO PHQ QUESTIONS 1-9: 23
SUM OF ALL RESPONSES TO PHQ QUESTIONS 1-9: 23

## 2020-10-04 ASSESSMENT — ANXIETY QUESTIONNAIRES: GAD7 TOTAL SCORE: 20

## 2020-10-04 ASSESSMENT — PATIENT HEALTH QUESTIONNAIRE - PHQ9: SUM OF ALL RESPONSES TO PHQ QUESTIONS 1-9: 23

## 2020-10-05 NOTE — TELEPHONE ENCOUNTER
Will route to provider that is in clinic in absence of Dr. Akins.    Seema Pike RN  ....................  10/5/2020   11:37 AM

## 2020-10-05 NOTE — TELEPHONE ENCOUNTER
Provider E-Visit time total (minutes): 0    Please call the patient and notify her that she needs to be seen this week either in the clinic or via a telephone/video visit.  She can also go to the emergency room if she is having concerning symptoms and needs help immediately.  Zainab Paula PA-C.......... 10/5/2020 3:32 PM

## 2020-10-05 NOTE — PATIENT INSTRUCTIONS
"  Thank you for choosing us for your care. I think an in-clinic visit would be best next steps based on your symptoms. Please schedule a clinic appointment; you won t be charged for this e-visit.      You can schedule an appointment right here in [facility], or call [phone]      Warning Signs of Suicide and What You Can Do    If you think a person could be suicidal, ask, \"Have you thought about suicide?\" If they say \"yes,\" they may already have a plan for how and when they will attempt it. Find out as much as you can. The more detailed the plan, and the easier it is to carry out, the more danger the person is in right now.  Know the warning signs  The warning signs for suicide include:    Threats or talk of suicide    Sense of hopelessness    Buying a gun or other weapon    Statements such as \"Soon, I won't be a problem\" or \"Nothing matters\"    Giving away items they own, making out a will, or planning their     Suddenly being happy or calm after being depressed  Factors that put a person at a higher risk of attempting suicide include:    A history of suicide in the person's family    Previous suicide attempts    Alcohol and drug use, along with impulsive behaviors    Having a diagnose mood disorder such as depression or bipolar disorder    History of trauma or abuse including bullying    Significant losses such as a divorce, death of a loved one, financial problems, or legal problems    Having access to a lethal weapon (for example firearms in the home)    Chronic physical illnesses, including chronic pain    Exposure to suicidal behavior of others  Get help  Don't try to handle this alone. You can be the most help by getting the person to a trained professional. Suicidal thinking may be a sign of depression, a serious but treatable illness.  In an emergency--call 691  Don't leave the person alone. Anyone who is at imminent risk of suicide needs psychiatric services right away. The person must be " "continuously monitored, and never left out of sight. Call 911 or a 24-hour suicide crisis hotline. It can be found in the white pages of your phone book under \"Suicide.\" You can also take the person to the nearest hospital emergency room (ER).  Don't keep it a secret and don't wait  Call a mental health clinic or a licensed mental health professional in your area right away: a psychiatrist, clinical psychologist, psychiatric or licensed clinical , marriage and family counselor, or clergy. Tell them you need help for a person who is thinking about suicide.  Resources    National Suicide Prevention Pbnprfvv709-384-1134 (747-208-WMKM)www.suicidepreventionlifeline.org    National Suicide Ivjpjss989-592-6642 (800-SUICIDE)    National [place] of Mental Uwuqtt923-194-6369npb.[facility].nih.gov    National [place] on Mental Ghmukxk379-912-6025cpa.shad.org    Mental Health Govjukw026-888-7524pst.nmha.org   Date Last Reviewed: 1/1/2017 2000-2019 [employer]. [address] [place] [address]. All rights reserved. This information is not intended as a substitute for professional medical care. Always follow your healthcare professional's instructions.          Depression: Tips to Help Yourself    As your healthcare providers help treat your depression, you can also help yourself. Keep in mind that your illness affects you emotionally, physically, mentally, and socially. So full recovery will take time. Take care of your body and your soul, and be patient with yourself as you get better.  Self-care    Educate yourself. Read about treatment and medicine options. If you have the energy, attend local conferences or support groups. Keep a list of useful websites and helpful books and use them as needed. This illness is not your fault. Don t blame yourself for your depression.    Manage early symptoms. If you notice symptoms returning, experience triggers, or identify other factors that may lead to " a depressive episode, get help as soon as possible. Ask trusted friends and family to monitor your behavior and let you know if they see anything of concern.    Work with your provider. Find a provider you can trust. Communicate honestly with that person and share information on your treatment for depression and your reaction to medicines.    Be prepared for a crisis. Know what to do if you experience a crisis. Keep the phone number of a crisis hotline and know the location of your community's urgent care centers and the closest emergency department.    Hold off on big decisions. Depression can cloud your judgment. So wait until you feel better before making major life decisions, such as changing jobs, moving, or getting  or .    Be patient. Recovering from depression is a process. Don t be discouraged if it takes some time to feel better.    Keep it simple. Depression saps your energy and concentration. So you won t be able to do all the things you used to do. Set small goals and do what you can.    Be with others. Don t isolate yourself--you ll only feel worse. Try to be with other people. And take part in fun activities when you can. Go to a movie, ballgame, [employer] service, or social event. Talk openly with people you can trust. And accept help when it s offered.  Take care of your body  People with depression often lose the desire to take care of themselves. That only makes their problems worse. During treatment and afterward, make a point to:    Exercise. It s a great way to take care of your body. And studies have shown that exercise helps fight depression.    Avoid drugs and alcohol. These may ease the pain in the short term. But they ll only make your problems worse in the long run.    Get relief from stress. Ask your healthcare provider for relaxation exercises and techniques to help relieve stress.    Eat right. A balanced and healthy diet helps keep your body healthy.  Date Last  Reviewed: 1/1/2017 2000-2019 [employer]. [address], [place] [address]. All rights reserved. This information is not intended as a substitute for professional medical care. Always follow your healthcare professional's instructions.          Depression Affects Your Mind and Body  Everyone feels sad or  blue  from time to time for a few days or weeks. Depression is when these feelings don't go away and they interfere with daily life.  Depression is a real illness that can develop at any age. It is one of the most common mental health problems in the [place] Depression makes you feel sad, helpless, and hopeless. It gets in the way of your life and relationships. It inhibits your ability to think and act. But, with help, you can feel better again.      When I was depressed, I felt awful. I was so tired all the time I could hardly think, but at night I couldn t fall asleep. My head hurt. My stomach hurt. I didn t know what was wrong with me.    Depression affects your whole body  Brain chemicals affect your body as well as your mood. So depression may do more than just make you feel low. You may also feel bad physically. Depression can:    Cause trouble with mental tasks such as remembering, concentrating, or making decisions    Make you feel nervous and jumpy    Cause trouble sleeping. Or you may sleep too much    Change your appetite    Cause headaches, stomachaches, or other aches and pains    Drain your body of energy  Depression and other illness  It is common for people who have chronic health problems to also have depression. It can often be hard to tell which one caused the other. A person might become depressed after finding out they have a health problem. But some studies suggest being depressed may make certain health problems more likely. And some depressed people stop taking care of themselves. This may make them more likely to get sick.  Date Last Reviewed: 1/1/2017 2000-2019  [employer]. [address] [place] [address], [place] [address]. All rights reserved. This information is not intended as a substitute for professional medical care. Always follow your healthcare professional's instructions.

## 2020-10-06 ENCOUNTER — OFFICE VISIT (OUTPATIENT)
Dept: FAMILY MEDICINE | Facility: OTHER | Age: 35
End: 2020-10-06
Attending: FAMILY MEDICINE
Payer: COMMERCIAL

## 2020-10-06 ENCOUNTER — TELEPHONE (OUTPATIENT)
Dept: SURGERY | Facility: OTHER | Age: 35
End: 2020-10-06

## 2020-10-06 VITALS
HEIGHT: 65 IN | OXYGEN SATURATION: 98 % | HEART RATE: 94 BPM | TEMPERATURE: 98.4 F | WEIGHT: 292.5 LBS | SYSTOLIC BLOOD PRESSURE: 136 MMHG | DIASTOLIC BLOOD PRESSURE: 80 MMHG | BODY MASS INDEX: 48.73 KG/M2 | RESPIRATION RATE: 20 BRPM

## 2020-10-06 DIAGNOSIS — F32.A ANXIETY AND DEPRESSION: Primary | ICD-10-CM

## 2020-10-06 DIAGNOSIS — Z23 NEEDS FLU SHOT: ICD-10-CM

## 2020-10-06 DIAGNOSIS — F41.9 ANXIETY AND DEPRESSION: Primary | ICD-10-CM

## 2020-10-06 DIAGNOSIS — R13.19 ESOPHAGEAL DYSPHAGIA: ICD-10-CM

## 2020-10-06 DIAGNOSIS — R93.89 ABNORMAL CHEST CT: ICD-10-CM

## 2020-10-06 PROCEDURE — 99214 OFFICE O/P EST MOD 30 MIN: CPT | Mod: 25 | Performed by: FAMILY MEDICINE

## 2020-10-06 PROCEDURE — 90686 IIV4 VACC NO PRSV 0.5 ML IM: CPT | Performed by: FAMILY MEDICINE

## 2020-10-06 PROCEDURE — 90471 IMMUNIZATION ADMIN: CPT | Performed by: FAMILY MEDICINE

## 2020-10-06 RX ORDER — FLUOXETINE 10 MG/1
10 CAPSULE ORAL DAILY
Qty: 7 CAPSULE | Refills: 0 | Status: SHIPPED | OUTPATIENT
Start: 2020-10-06 | End: 2020-11-03 | Stop reason: DRUGHIGH

## 2020-10-06 ASSESSMENT — ANXIETY QUESTIONNAIRES
5. BEING SO RESTLESS THAT IT IS HARD TO SIT STILL: MORE THAN HALF THE DAYS
3. WORRYING TOO MUCH ABOUT DIFFERENT THINGS: NEARLY EVERY DAY
2. NOT BEING ABLE TO STOP OR CONTROL WORRYING: NEARLY EVERY DAY
6. BECOMING EASILY ANNOYED OR IRRITABLE: NEARLY EVERY DAY
1. FEELING NERVOUS, ANXIOUS, OR ON EDGE: NEARLY EVERY DAY
7. FEELING AFRAID AS IF SOMETHING AWFUL MIGHT HAPPEN: NEARLY EVERY DAY
GAD7 TOTAL SCORE: 20
IF YOU CHECKED OFF ANY PROBLEMS ON THIS QUESTIONNAIRE, HOW DIFFICULT HAVE THESE PROBLEMS MADE IT FOR YOU TO DO YOUR WORK, TAKE CARE OF THINGS AT HOME, OR GET ALONG WITH OTHER PEOPLE: EXTREMELY DIFFICULT

## 2020-10-06 ASSESSMENT — ENCOUNTER SYMPTOMS
COUGH: 0
FEVER: 0
TROUBLE SWALLOWING: 1
NERVOUS/ANXIOUS: 1
FATIGUE: 0
ABDOMINAL PAIN: 0
CHILLS: 0

## 2020-10-06 ASSESSMENT — PATIENT HEALTH QUESTIONNAIRE - PHQ9
5. POOR APPETITE OR OVEREATING: NEARLY EVERY DAY
SUM OF ALL RESPONSES TO PHQ QUESTIONS 1-9: 24

## 2020-10-06 ASSESSMENT — PAIN SCALES - GENERAL: PAINLEVEL: NO PAIN (0)

## 2020-10-06 ASSESSMENT — MIFFLIN-ST. JEOR: SCORE: 2032.65

## 2020-10-06 NOTE — TELEPHONE ENCOUNTER
Patient has in clinic appt scheduled for today.   Maribell Shepard LPN .............10/6/2020     10:11 AM
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
## 2020-10-06 NOTE — NURSING NOTE
Patient presents to clinic for discussion on depression and anxiety.  Also, after CT scan on 09/12/20 lung nodules were noticed.  Medication Reconciliation: complete    Flowertanja Roy LPN

## 2020-10-06 NOTE — TELEPHONE ENCOUNTER
Patient referred by Dr. Mccord for a Upper GI endoscopy,  Diagnosis is esophageal dysphagia.  Please advise if ok to schedule .  Thank you. Martine Pires on 10/6/2020 at 3:06 PM

## 2020-10-07 ENCOUNTER — HOSPITAL ENCOUNTER (OUTPATIENT)
Facility: OTHER | Age: 35
End: 2020-10-07
Attending: SURGERY | Admitting: SURGERY
Payer: COMMERCIAL

## 2020-10-07 ENCOUNTER — TELEPHONE (OUTPATIENT)
Dept: SURGERY | Facility: OTHER | Age: 35
End: 2020-10-07

## 2020-10-07 RX ORDER — BISACODYL 5 MG
TABLET, DELAYED RELEASE (ENTERIC COATED) ORAL
Qty: 2 TABLET | Refills: 0 | Status: CANCELLED | OUTPATIENT
Start: 2020-10-07

## 2020-10-07 RX ORDER — POLYETHYLENE GLYCOL 3350, SODIUM CHLORIDE, SODIUM BICARBONATE, POTASSIUM CHLORIDE 420; 11.2; 5.72; 1.48 G/4L; G/4L; G/4L; G/4L
4000 POWDER, FOR SOLUTION ORAL ONCE
Qty: 4000 ML | Refills: 0 | Status: CANCELLED | OUTPATIENT
Start: 2020-10-07 | End: 2020-10-07

## 2020-10-07 ASSESSMENT — ANXIETY QUESTIONNAIRES: GAD7 TOTAL SCORE: 20

## 2020-10-07 NOTE — TELEPHONE ENCOUNTER
Screening Questions for the Scheduling of Screening Colonoscopies   (If Colonoscopy is diagnostic, Provider should review the chart before scheduling.)  Are you younger than 50 or older than 80?  YES   Do you take aspirin or fish oil?  NO  (if yes, tell patient to stop 1 week prior to Colonoscopy)  Do you take warfarin (Coumadin), clopidogrel (Plavix), apixaban (Eliquis), dabigatram (Pradaxa), rivaroxaban (Xarelto) or any blood thinner? NO   Do you use oxygen at home?  NO  Do you have kidney disease? NO   Are you on dialysis? NO   Have you had a stroke or heart attack in the last year? NO   Have you had a stent in your heart or any blood vessel in the last year? NO   Have you had a transplant of any organ? NO   Have you had a colonoscopy or upper endoscopy (EGD) before? NO          When?    Date of scheduled EGD   11/10/2020  Provider [facility]   Pharmacy

## 2020-11-02 ENCOUNTER — TRANSFERRED RECORDS (OUTPATIENT)
Dept: HEALTH INFORMATION MANAGEMENT | Facility: OTHER | Age: 35
End: 2020-11-02

## 2020-11-03 ENCOUNTER — OFFICE VISIT (OUTPATIENT)
Dept: FAMILY MEDICINE | Facility: OTHER | Age: 35
End: 2020-11-03
Payer: COMMERCIAL

## 2020-11-03 VITALS
RESPIRATION RATE: 20 BRPM | TEMPERATURE: 98.5 F | BODY MASS INDEX: 48.38 KG/M2 | HEIGHT: 65 IN | DIASTOLIC BLOOD PRESSURE: 84 MMHG | WEIGHT: 290.38 LBS | OXYGEN SATURATION: 98 % | SYSTOLIC BLOOD PRESSURE: 128 MMHG | HEART RATE: 95 BPM

## 2020-11-03 DIAGNOSIS — F32.A ANXIETY AND DEPRESSION: Primary | ICD-10-CM

## 2020-11-03 DIAGNOSIS — R93.89 ABNORMAL CHEST CT: ICD-10-CM

## 2020-11-03 DIAGNOSIS — F41.9 ANXIETY AND DEPRESSION: Primary | ICD-10-CM

## 2020-11-03 PROBLEM — E66.01 MORBID OBESITY (H): Status: ACTIVE | Noted: 2020-11-03

## 2020-11-03 PROCEDURE — 99213 OFFICE O/P EST LOW 20 MIN: CPT | Performed by: FAMILY MEDICINE

## 2020-11-03 RX ORDER — FLUOXETINE 40 MG/1
40 CAPSULE ORAL DAILY
Qty: 90 CAPSULE | Refills: 3 | Status: SHIPPED | OUTPATIENT
Start: 2020-11-03 | End: 2021-10-07

## 2020-11-03 ASSESSMENT — ANXIETY QUESTIONNAIRES
GAD7 TOTAL SCORE: 18
3. WORRYING TOO MUCH ABOUT DIFFERENT THINGS: NEARLY EVERY DAY
IF YOU CHECKED OFF ANY PROBLEMS ON THIS QUESTIONNAIRE, HOW DIFFICULT HAVE THESE PROBLEMS MADE IT FOR YOU TO DO YOUR WORK, TAKE CARE OF THINGS AT HOME, OR GET ALONG WITH OTHER PEOPLE: VERY DIFFICULT
5. BEING SO RESTLESS THAT IT IS HARD TO SIT STILL: MORE THAN HALF THE DAYS
6. BECOMING EASILY ANNOYED OR IRRITABLE: MORE THAN HALF THE DAYS
2. NOT BEING ABLE TO STOP OR CONTROL WORRYING: NEARLY EVERY DAY
1. FEELING NERVOUS, ANXIOUS, OR ON EDGE: NEARLY EVERY DAY
7. FEELING AFRAID AS IF SOMETHING AWFUL MIGHT HAPPEN: NEARLY EVERY DAY

## 2020-11-03 ASSESSMENT — MIFFLIN-ST. JEOR: SCORE: 2023.01

## 2020-11-03 ASSESSMENT — ENCOUNTER SYMPTOMS
COUGH: 0
FEVER: 0
NERVOUS/ANXIOUS: 1
SHORTNESS OF BREATH: 0
CHILLS: 0

## 2020-11-03 ASSESSMENT — PATIENT HEALTH QUESTIONNAIRE - PHQ9
5. POOR APPETITE OR OVEREATING: MORE THAN HALF THE DAYS
SUM OF ALL RESPONSES TO PHQ QUESTIONS 1-9: 19

## 2020-11-03 ASSESSMENT — PAIN SCALES - GENERAL: PAINLEVEL: NO PAIN (0)

## 2020-11-03 NOTE — NURSING NOTE
Patient presents to clinic for follow up with medication management.  Medication Reconciliation: complete    Flower Roy LPN
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
## 2020-11-04 ASSESSMENT — ANXIETY QUESTIONNAIRES: GAD7 TOTAL SCORE: 18

## 2020-11-18 ENCOUNTER — HOSPITAL ENCOUNTER (OUTPATIENT)
Dept: PET IMAGING | Facility: OTHER | Age: 35
Discharge: HOME | End: 2020-11-18
Attending: FAMILY MEDICINE | Admitting: FAMILY MEDICINE
Payer: COMMERCIAL

## 2020-11-18 DIAGNOSIS — R93.89 ABNORMAL CHEST CT: ICD-10-CM

## 2020-11-18 PROCEDURE — 343N000001 HC RX 343: Performed by: FAMILY MEDICINE

## 2020-11-18 PROCEDURE — A9552 F18 FDG: HCPCS | Performed by: FAMILY MEDICINE

## 2020-11-18 PROCEDURE — 78815 PET IMAGE W/CT SKULL-THIGH: CPT | Mod: PI

## 2020-11-18 RX ADMIN — FLUDEOXYGLUCOSE F-18 13.68 MCI.: 500 INJECTION, SOLUTION INTRAVENOUS at 15:50

## 2020-11-23 ENCOUNTER — TRANSFERRED RECORDS (OUTPATIENT)
Dept: HEALTH INFORMATION MANAGEMENT | Facility: OTHER | Age: 35
End: 2020-11-23

## 2020-11-24 DIAGNOSIS — N94.9 ADNEXAL CYST: Primary | ICD-10-CM

## 2020-11-25 ENCOUNTER — HOSPITAL ENCOUNTER (OUTPATIENT)
Dept: ULTRASOUND IMAGING | Facility: OTHER | Age: 35
Discharge: HOME | End: 2020-11-25
Attending: FAMILY MEDICINE | Admitting: FAMILY MEDICINE
Payer: COMMERCIAL

## 2020-11-25 DIAGNOSIS — N94.9 ADNEXAL CYST: ICD-10-CM

## 2020-11-25 PROCEDURE — 76856 US EXAM PELVIC COMPLETE: CPT

## 2020-11-28 DIAGNOSIS — N94.9 ADNEXAL CYST: Primary | ICD-10-CM

## 2020-11-30 ENCOUNTER — TELEPHONE (OUTPATIENT)
Dept: FAMILY MEDICINE | Facility: OTHER | Age: 35
End: 2020-11-30

## 2020-11-30 ENCOUNTER — TRANSFERRED RECORDS (OUTPATIENT)
Dept: HEALTH INFORMATION MANAGEMENT | Facility: OTHER | Age: 35
End: 2020-11-30

## 2020-11-30 NOTE — TELEPHONE ENCOUNTER
Patient notified of Ultrasound results below per Helen Mccord MD and she verbally understood.    Please call - her pelvic ultrasound showed that she has a 4.7x7.0 cm cyst.  It does not have any concerning features, but due to its size, would recommend that she have a follow up ultrasound in 6-8 weeks to ensure that it has resolved.  Order placed.  MD Flower Carranza LPN............11/30/2020 3:40 PM

## 2020-12-08 ENCOUNTER — OFFICE VISIT (OUTPATIENT)
Dept: FAMILY MEDICINE | Facility: OTHER | Age: 35
End: 2020-12-08
Attending: FAMILY MEDICINE
Payer: COMMERCIAL

## 2020-12-08 VITALS
DIASTOLIC BLOOD PRESSURE: 74 MMHG | TEMPERATURE: 97.2 F | OXYGEN SATURATION: 98 % | HEIGHT: 65 IN | SYSTOLIC BLOOD PRESSURE: 128 MMHG | BODY MASS INDEX: 47.57 KG/M2 | RESPIRATION RATE: 20 BRPM | HEART RATE: 91 BPM | WEIGHT: 285.5 LBS

## 2020-12-08 DIAGNOSIS — F32.A ANXIETY AND DEPRESSION: Primary | ICD-10-CM

## 2020-12-08 DIAGNOSIS — F41.9 ANXIETY AND DEPRESSION: Primary | ICD-10-CM

## 2020-12-08 PROCEDURE — 99213 OFFICE O/P EST LOW 20 MIN: CPT | Performed by: FAMILY MEDICINE

## 2020-12-08 ASSESSMENT — PATIENT HEALTH QUESTIONNAIRE - PHQ9
SUM OF ALL RESPONSES TO PHQ QUESTIONS 1-9: 11
5. POOR APPETITE OR OVEREATING: MORE THAN HALF THE DAYS

## 2020-12-08 ASSESSMENT — ANXIETY QUESTIONNAIRES
GAD7 TOTAL SCORE: 9
IF YOU CHECKED OFF ANY PROBLEMS ON THIS QUESTIONNAIRE, HOW DIFFICULT HAVE THESE PROBLEMS MADE IT FOR YOU TO DO YOUR WORK, TAKE CARE OF THINGS AT HOME, OR GET ALONG WITH OTHER PEOPLE: SOMEWHAT DIFFICULT
5. BEING SO RESTLESS THAT IT IS HARD TO SIT STILL: SEVERAL DAYS
6. BECOMING EASILY ANNOYED OR IRRITABLE: SEVERAL DAYS
7. FEELING AFRAID AS IF SOMETHING AWFUL MIGHT HAPPEN: SEVERAL DAYS
2. NOT BEING ABLE TO STOP OR CONTROL WORRYING: SEVERAL DAYS
3. WORRYING TOO MUCH ABOUT DIFFERENT THINGS: MORE THAN HALF THE DAYS
1. FEELING NERVOUS, ANXIOUS, OR ON EDGE: SEVERAL DAYS

## 2020-12-08 ASSESSMENT — ENCOUNTER SYMPTOMS
COUGH: 0
NERVOUS/ANXIOUS: 0
SHORTNESS OF BREATH: 0
FEVER: 0
CHILLS: 0

## 2020-12-08 ASSESSMENT — PAIN SCALES - GENERAL: PAINLEVEL: NO PAIN (0)

## 2020-12-08 ASSESSMENT — MIFFLIN-ST. JEOR: SCORE: 2000.9

## 2020-12-08 NOTE — NURSING NOTE
Patient presents to clinic for medication management.  Medication Reconciliation: complete    Flower Roy, RAMANN

## 2020-12-08 NOTE — PROGRESS NOTES
SUBJECTIVE:   Nursing Notes:   Flower Roy LPN  12/8/2020  9:19 AM  Sign at exiting of workspace  Patient presents to clinic for medication management.  Medication Reconciliation: complete    Flower Roy LPN        Bean KUMAR Garcia is a 35 year old female who presents to clinic today for follow up.  I had last seen her on 11/3/2020 for her anxiety and depression. We had increased her Prozac to 40 mg daily at that time.  She has been attending virtual counseling through the [employer] [facility].  She feels her current dose of prozac is helping.      She is doing a test once a week at work for covid at [employer].  She has had no symptoms.     HPI    I personally reviewed medications/allergies/history listed below:    Patient Active Problem List    Diagnosis Date Noted     Morbid obesity (H) 11/03/2020     Priority: Medium     Past Medical History:   Diagnosis Date     Mild intermittent asthma without complication       History reviewed. No pertinent surgical history.  Family History   Problem Relation Age of Onset     Arthritis Mother         hip replacement     Valvular heart disease Father      Asthma Father      Diabetes Father      Depression Sister      Family History Negative Brother      Valvular heart disease Maternal Grandmother      Kidney Disease Maternal Grandfather         dialysis     Heart Disease Maternal Grandfather         valvular heart disease.     Rheumatoid Arthritis Paternal Grandmother      Heart Failure Paternal Grandfather      Social History     Tobacco Use     Smoking status: Never Smoker     Smokeless tobacco: Never Used   Substance Use Topics     Alcohol use: No     Social History     Social History Narrative    Lives with parents.  Single, no children.    Has one older brother and one older sister.    Works for [employer] in [place].      Current Outpatient Medications   Medication Sig Dispense Refill     albuterol (PROAIR HFA/PROVENTIL HFA/VENTOLIN HFA) 108 (90 Base) MCG/ACT inhaler  "Inhale 2 puffs into the lungs every 6 hours as needed for shortness of breath / dyspnea or wheezing 1 Inhaler 1     FLUoxetine (PROZAC) 40 MG capsule Take 1 capsule (40 mg) by mouth daily 90 capsule 3     Allergies   Allergen Reactions     Banana Nausea and Vomiting     Cats      Dogs      Dust Mites      Milk-Related Compounds Nausea and Vomiting and Diarrhea     Amoxicillin Swelling and Rash     Other reaction(s): Edema  joints       Review of Systems   Constitutional: Negative for chills and fever.   Respiratory: Negative for cough and shortness of breath.    Cardiovascular: Negative for peripheral edema.   Psychiatric/Behavioral: Negative for mood changes. The patient is not nervous/anxious.         OBJECTIVE:     /74 (BP Location: Right arm, Patient Position: Sitting, Cuff Size: Adult Large)   Pulse 91   Temp 97.2  F (36.2  C) (Tympanic)   Resp 20   Ht 1.651 m (5' 5\")   Wt 129.5 kg (285 lb 8 oz)   LMP  (LMP Unknown)   SpO2 98%   Breastfeeding No   BMI 47.51 kg/m    Body mass index is 47.51 kg/m .  Physical Exam  Constitutional:       Appearance: Normal appearance.   Eyes:      Extraocular Movements: Extraocular movements intact.      Pupils: Pupils are equal, round, and reactive to light.   Neck:      Musculoskeletal: Normal range of motion and neck supple.   Cardiovascular:      Rate and Rhythm: Normal rate and regular rhythm.      Heart sounds: No murmur.   Pulmonary:      Breath sounds: No wheezing, rhonchi or rales.   Lymphadenopathy:      Cervical: No cervical adenopathy.   Neurological:      Mental Status: She is alert.   Psychiatric:         Mood and Affect: Mood normal.           PHQ-9 SCORE 10/6/2020 11/3/2020 12/8/2020   PHQ-9 Total Score MyChart - - -   PHQ-9 Total Score 24 19 11       PHQ-2 Score:     PHQ-2 ( 1999 Pfizer) 12/8/2020 10/6/2020   Q1: Little interest or pleasure in doing things 2 3   Q2: Feeling down, depressed or hopeless 1 3   PHQ-2 Score 3 6       LYNDA-7 SCORE 10/6/2020 " 11/3/2020 12/8/2020   Total Score - - -   Total Score 20 18 9         I personally reviewed results withpatient as listed below:   Diagnostic Test Results:  none     ASSESSMENT/PLAN:       ICD-10-CM    1. Anxiety and depression  F41.9     F32.9        1.  She feels that she is doing much better on her current dose of fluoxetine and would like to stay on this dose for now.  She will continue her virtual counseling.  Follow up as needed.    Helen Mccord MD  [facility] AND [facility]    Portions of this dictation were created using the Dragon Nuance voice recognition system. Proofreading was completed but there may be errors in text.

## 2020-12-09 ASSESSMENT — ANXIETY QUESTIONNAIRES: GAD7 TOTAL SCORE: 9

## 2020-12-20 ENCOUNTER — HEALTH MAINTENANCE LETTER (OUTPATIENT)
Age: 35
End: 2020-12-20

## 2021-01-12 ENCOUNTER — HOSPITAL ENCOUNTER (OUTPATIENT)
Dept: ULTRASOUND IMAGING | Facility: OTHER | Age: 36
Discharge: HOME | End: 2021-01-12
Attending: FAMILY MEDICINE | Admitting: FAMILY MEDICINE
Payer: COMMERCIAL

## 2021-01-12 DIAGNOSIS — N94.89 ADNEXAL MASS: Primary | ICD-10-CM

## 2021-01-12 DIAGNOSIS — N94.9 ADNEXAL CYST: ICD-10-CM

## 2021-01-12 PROCEDURE — 76830 TRANSVAGINAL US NON-OB: CPT

## 2021-01-14 ENCOUNTER — OFFICE VISIT (OUTPATIENT)
Dept: OBGYN | Facility: OTHER | Age: 36
End: 2021-01-14
Attending: FAMILY MEDICINE
Payer: COMMERCIAL

## 2021-01-14 VITALS
HEART RATE: 82 BPM | WEIGHT: 288.7 LBS | BODY MASS INDEX: 48.04 KG/M2 | SYSTOLIC BLOOD PRESSURE: 152 MMHG | DIASTOLIC BLOOD PRESSURE: 82 MMHG

## 2021-01-14 DIAGNOSIS — N94.89 ADNEXAL MASS: Primary | ICD-10-CM

## 2021-01-14 LAB
AFP SERPL-MCNC: <1.5 UG/L (ref 0–8)
B-HCG SERPL-ACNC: <1 IU/L
CANCER AG125 SERPL-ACNC: 19 U/ML (ref 0–35)
LDH SERPL L TO P-CCNC: 201 U/L (ref 140–271)
MISCELLANEOUS TEST: NORMAL

## 2021-01-14 PROCEDURE — G0123 SCREEN CERV/VAG THIN LAYER: HCPCS | Performed by: STUDENT IN AN ORGANIZED HEALTH CARE EDUCATION/TRAINING PROGRAM

## 2021-01-14 PROCEDURE — 83615 LACTATE (LD) (LDH) ENZYME: CPT | Mod: ZL | Performed by: STUDENT IN AN ORGANIZED HEALTH CARE EDUCATION/TRAINING PROGRAM

## 2021-01-14 PROCEDURE — 83520 IMMUNOASSAY QUANT NOS NONAB: CPT | Mod: ZL | Performed by: STUDENT IN AN ORGANIZED HEALTH CARE EDUCATION/TRAINING PROGRAM

## 2021-01-14 PROCEDURE — 36415 COLL VENOUS BLD VENIPUNCTURE: CPT | Mod: ZL | Performed by: STUDENT IN AN ORGANIZED HEALTH CARE EDUCATION/TRAINING PROGRAM

## 2021-01-14 PROCEDURE — 86304 IMMUNOASSAY TUMOR CA 125: CPT | Mod: ZL | Performed by: STUDENT IN AN ORGANIZED HEALTH CARE EDUCATION/TRAINING PROGRAM

## 2021-01-14 PROCEDURE — 90471 IMMUNIZATION ADMIN: CPT | Performed by: STUDENT IN AN ORGANIZED HEALTH CARE EDUCATION/TRAINING PROGRAM

## 2021-01-14 PROCEDURE — 84702 CHORIONIC GONADOTROPIN TEST: CPT | Mod: ZL | Performed by: STUDENT IN AN ORGANIZED HEALTH CARE EDUCATION/TRAINING PROGRAM

## 2021-01-14 PROCEDURE — 88142 CYTOPATH C/V THIN LAYER: CPT | Performed by: STUDENT IN AN ORGANIZED HEALTH CARE EDUCATION/TRAINING PROGRAM

## 2021-01-14 PROCEDURE — 90651 9VHPV VACCINE 2/3 DOSE IM: CPT | Performed by: STUDENT IN AN ORGANIZED HEALTH CARE EDUCATION/TRAINING PROGRAM

## 2021-01-14 PROCEDURE — 99205 OFFICE O/P NEW HI 60 MIN: CPT | Mod: 25 | Performed by: STUDENT IN AN ORGANIZED HEALTH CARE EDUCATION/TRAINING PROGRAM

## 2021-01-14 PROCEDURE — 82105 ALPHA-FETOPROTEIN SERUM: CPT | Mod: ZL | Performed by: STUDENT IN AN ORGANIZED HEALTH CARE EDUCATION/TRAINING PROGRAM

## 2021-01-14 RX ORDER — FLUOROMETHOLONE 0.1 %
SUSPENSION, DROPS(FINAL DOSAGE FORM)(ML) OPHTHALMIC (EYE)
COMMUNITY
Start: 2021-01-05 | End: 2021-10-07
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
## 2021-01-14 NOTE — NURSING NOTE
Pt presents to clinic today for consult follow up ultrasound.      Medication Reconciliation: complete  Yessenia Underwood LPN

## 2021-01-15 LAB
RESULT: NORMAL
SEND OUTS MISC TEST CODE: NORMAL
SEND OUTS MISC TEST SPECIMEN: NORMAL
TEST NAME: NORMAL

## 2021-01-16 ENCOUNTER — MYC MEDICAL ADVICE (OUTPATIENT)
Dept: OBGYN | Facility: OTHER | Age: 36
End: 2021-01-16

## 2021-01-17 LAB — INHIBIN B SERPL-MCNC: 13 PG/ML

## 2021-01-18 ENCOUNTER — HOSPITAL ENCOUNTER (EMERGENCY)
Facility: OTHER | Age: 36
Discharge: HOME | End: 2021-01-18
Attending: PHYSICIAN ASSISTANT | Admitting: PHYSICIAN ASSISTANT
Payer: COMMERCIAL

## 2021-01-18 ENCOUNTER — APPOINTMENT (OUTPATIENT)
Dept: ULTRASOUND IMAGING | Facility: OTHER | Age: 36
End: 2021-01-18
Attending: PHYSICIAN ASSISTANT
Payer: COMMERCIAL

## 2021-01-18 ENCOUNTER — APPOINTMENT (OUTPATIENT)
Dept: CT IMAGING | Facility: OTHER | Age: 36
End: 2021-01-18
Attending: PHYSICIAN ASSISTANT
Payer: COMMERCIAL

## 2021-01-18 ENCOUNTER — TELEPHONE (OUTPATIENT)
Dept: OBGYN | Facility: OTHER | Age: 36
End: 2021-01-18

## 2021-01-18 VITALS
DIASTOLIC BLOOD PRESSURE: 73 MMHG | OXYGEN SATURATION: 95 % | SYSTOLIC BLOOD PRESSURE: 127 MMHG | HEIGHT: 65 IN | RESPIRATION RATE: 18 BRPM | BODY MASS INDEX: 48.32 KG/M2 | TEMPERATURE: 98.5 F | WEIGHT: 290 LBS | HEART RATE: 85 BPM

## 2021-01-18 DIAGNOSIS — R10.32 ABDOMINAL PAIN, LEFT LOWER QUADRANT: ICD-10-CM

## 2021-01-18 DIAGNOSIS — N83.202 LEFT OVARIAN CYST: ICD-10-CM

## 2021-01-18 LAB
ALBUMIN SERPL-MCNC: 4 G/DL (ref 3.5–5.7)
ALBUMIN UR-MCNC: 10 MG/DL
ALP SERPL-CCNC: 60 U/L (ref 34–104)
ALT SERPL W P-5'-P-CCNC: 17 U/L (ref 7–52)
ANION GAP SERPL CALCULATED.3IONS-SCNC: 8 MMOL/L (ref 3–14)
APPEARANCE UR: CLEAR
AST SERPL W P-5'-P-CCNC: 17 U/L (ref 13–39)
BASOPHILS # BLD AUTO: 0 10E9/L (ref 0–0.2)
BASOPHILS NFR BLD AUTO: 0.3 %
BILIRUB SERPL-MCNC: 0.7 MG/DL (ref 0.3–1)
BILIRUB UR QL STRIP: NEGATIVE
BUN SERPL-MCNC: 14 MG/DL (ref 7–25)
CALCIUM SERPL-MCNC: 9.1 MG/DL (ref 8.6–10.3)
CHLORIDE SERPL-SCNC: 102 MMOL/L (ref 98–107)
CO2 SERPL-SCNC: 27 MMOL/L (ref 21–31)
COLOR UR AUTO: ABNORMAL
CREAT SERPL-MCNC: 0.86 MG/DL (ref 0.6–1.2)
DIFFERENTIAL METHOD BLD: ABNORMAL
EOSINOPHIL # BLD AUTO: 0.1 10E9/L (ref 0–0.7)
EOSINOPHIL NFR BLD AUTO: 1.6 %
ERYTHROCYTE [DISTWIDTH] IN BLOOD BY AUTOMATED COUNT: 13.2 % (ref 10–15)
GFR SERPL CREATININE-BSD FRML MDRD: 76 ML/MIN/{1.73_M2}
GLUCOSE SERPL-MCNC: 93 MG/DL (ref 70–105)
GLUCOSE UR STRIP-MCNC: NEGATIVE MG/DL
HCG UR QL: NEGATIVE
HCT VFR BLD AUTO: 47 % (ref 35–47)
HGB BLD-MCNC: 15 G/DL (ref 11.7–15.7)
HGB UR QL STRIP: ABNORMAL
IMM GRANULOCYTES # BLD: 0 10E9/L (ref 0–0.4)
IMM GRANULOCYTES NFR BLD: 0.1 %
KETONES UR STRIP-MCNC: NEGATIVE MG/DL
LACTATE BLD-SCNC: 1.1 MMOL/L (ref 0.7–2)
LEUKOCYTE ESTERASE UR QL STRIP: NEGATIVE
LIPASE SERPL-CCNC: 34 U/L (ref 11–82)
LYMPHOCYTES # BLD AUTO: 1.6 10E9/L (ref 0.8–5.3)
LYMPHOCYTES NFR BLD AUTO: 23.5 %
MCH RBC QN AUTO: 27.7 PG (ref 26.5–33)
MCHC RBC AUTO-ENTMCNC: 31.9 G/DL (ref 31.5–36.5)
MCV RBC AUTO: 87 FL (ref 78–100)
MONOCYTES # BLD AUTO: 0.5 10E9/L (ref 0–1.3)
MONOCYTES NFR BLD AUTO: 6.6 %
MUCOUS THREADS #/AREA URNS LPF: PRESENT /LPF
NEUTROPHILS # BLD AUTO: 4.7 10E9/L (ref 1.6–8.3)
NEUTROPHILS NFR BLD AUTO: 67.9 %
NITRATE UR QL: NEGATIVE
PH UR STRIP: 5.5 PH (ref 5–7)
PLATELET # BLD AUTO: 251 10E9/L (ref 150–450)
POTASSIUM SERPL-SCNC: 3.7 MMOL/L (ref 3.5–5.1)
PROT SERPL-MCNC: 7.6 G/DL (ref 6.4–8.9)
RBC # BLD AUTO: 5.41 10E12/L (ref 3.8–5.2)
RBC #/AREA URNS AUTO: 1 /HPF (ref 0–2)
SODIUM SERPL-SCNC: 137 MMOL/L (ref 134–144)
SOURCE: ABNORMAL
SP GR UR STRIP: 1.03 (ref 1–1.03)
SQUAMOUS #/AREA URNS AUTO: 3 /HPF (ref 0–1)
UROBILINOGEN UR STRIP-MCNC: NORMAL MG/DL (ref 0–2)
WBC # BLD AUTO: 6.9 10E9/L (ref 4–11)
WBC #/AREA URNS AUTO: 2 /HPF (ref 0–5)

## 2021-01-18 PROCEDURE — 76830 TRANSVAGINAL US NON-OB: CPT

## 2021-01-18 PROCEDURE — 85025 COMPLETE CBC W/AUTO DIFF WBC: CPT | Performed by: PHYSICIAN ASSISTANT

## 2021-01-18 PROCEDURE — 76856 US EXAM PELVIC COMPLETE: CPT

## 2021-01-18 PROCEDURE — 99285 EMERGENCY DEPT VISIT HI MDM: CPT | Mod: 25 | Performed by: PHYSICIAN ASSISTANT

## 2021-01-18 PROCEDURE — 83690 ASSAY OF LIPASE: CPT | Performed by: PHYSICIAN ASSISTANT

## 2021-01-18 PROCEDURE — 36415 COLL VENOUS BLD VENIPUNCTURE: CPT | Performed by: PHYSICIAN ASSISTANT

## 2021-01-18 PROCEDURE — 81025 URINE PREGNANCY TEST: CPT | Performed by: PHYSICIAN ASSISTANT

## 2021-01-18 PROCEDURE — 74177 CT ABD & PELVIS W/CONTRAST: CPT

## 2021-01-18 PROCEDURE — 80053 COMPREHEN METABOLIC PANEL: CPT | Performed by: PHYSICIAN ASSISTANT

## 2021-01-18 PROCEDURE — 255N000002 HC RX 255 OP 636: Performed by: PHYSICIAN ASSISTANT

## 2021-01-18 PROCEDURE — 81001 URINALYSIS AUTO W/SCOPE: CPT | Performed by: PHYSICIAN ASSISTANT

## 2021-01-18 PROCEDURE — 99283 EMERGENCY DEPT VISIT LOW MDM: CPT | Performed by: PHYSICIAN ASSISTANT

## 2021-01-18 PROCEDURE — 83605 ASSAY OF LACTIC ACID: CPT | Performed by: PHYSICIAN ASSISTANT

## 2021-01-18 RX ADMIN — IOHEXOL 100 ML: 350 INJECTION, SOLUTION INTRAVENOUS at 19:49

## 2021-01-18 ASSESSMENT — MIFFLIN-ST. JEOR: SCORE: 2021.31

## 2021-01-18 NOTE — TELEPHONE ENCOUNTER
Call to patients mother and discussed that we have no release of information for this patient to speak with her and that patient would need to call to give this authorization. She has no further questions.     Cari Villavicencio RN on 1/18/2021 at 11:11 AM

## 2021-01-18 NOTE — TELEPHONE ENCOUNTER
Call received from patient and discussed recommendations. She will present to the ED.     Cari Villavicencio RN on 1/18/2021 at 3:22 PM

## 2021-01-18 NOTE — ED TRIAGE NOTES
Pt c/o cramping x 3 days. Pt has known Left ovary mass, RN at clinic instructed pt to come to ER to r/o torsion, pt did have period Saturday which lasted only 2 days. BM today WNL

## 2021-01-18 NOTE — TELEPHONE ENCOUNTER
Has questions about pt's condition.  Pt is having bad cramps. Please call        Emiliano Moreno on 1/18/2021 at 10:48 AM

## 2021-01-18 NOTE — TELEPHONE ENCOUNTER
Patient attempted to be called but no answer received. Left message and mychart sent. See Mychart. Discussed with Dr. Gary who agrees patient should present to ED.     Cari Villavicencio RN on 1/18/2021 at 2:15 PM

## 2021-01-18 NOTE — TELEPHONE ENCOUNTER
Patient attempted to be called but no answer was received.     Cari Villavicencio RN on 1/18/2021 at 1:58 PM

## 2021-01-19 ASSESSMENT — ENCOUNTER SYMPTOMS
NAUSEA: 0
CONFUSION: 0
BACK PAIN: 0
CHILLS: 0
CHEST TIGHTNESS: 0
ADENOPATHY: 0
BRUISES/BLEEDS EASILY: 0
HEMATURIA: 0
FEVER: 0
VOMITING: 0
WOUND: 0
ABDOMINAL PAIN: 1
SHORTNESS OF BREATH: 0

## 2021-01-19 NOTE — ED PROVIDER NOTES
History     Chief Complaint   Patient presents with     Abdominal Pain     HPI  Bean Garcia is a 35 year old female who presents to the ED for evaluation of abdominal pain.  She reports having some ongoing cramping over the last few days located on the left side of her abdomen.  She has been told that she has a left ovarian cyst.  The patient denies any fevers, chest pain, shortness of breath, dysuria.  No history of kidney stones.  No nausea or vomiting.  She has been seen by her OB/GYN for her known cysts and has a follow-up appointments next week to discuss options for treatment.    Allergies:  Allergies   Allergen Reactions     Banana Nausea and Vomiting     Cats      Dogs      Dust Mites      Milk-Related Compounds Nausea and Vomiting and Diarrhea     Amoxicillin Swelling and Rash     Other reaction(s): Edema  joints       Problem List:    Patient Active Problem List    Diagnosis Date Noted     Morbid obesity (H) 11/03/2020     Priority: Medium        Past Medical History:    Past Medical History:   Diagnosis Date     Mild intermittent asthma without complication        Past Surgical History:    No past surgical history on file.    Family History:    Family History   Problem Relation Age of Onset     Arthritis Mother         hip replacement     Valvular heart disease Father      Asthma Father      Diabetes Father      Depression Sister      Family History Negative Brother      Valvular heart disease Maternal Grandmother      Kidney Disease Maternal Grandfather         dialysis     Heart Disease Maternal Grandfather         valvular heart disease.     Rheumatoid Arthritis Paternal Grandmother      Heart Failure Paternal Grandfather        Social History:  Marital Status:  Single [1]  Social History     Tobacco Use     Smoking status: Never Smoker     Smokeless tobacco: Never Used   Substance Use Topics     Alcohol use: No     Drug use: No        Medications:         albuterol (PROAIR HFA/PROVENTIL  "HFA/VENTOLIN HFA) 108 (90 Base) MCG/ACT inhaler       fluorometholone (FML LIQUIFILM) 0.1 % ophthalmic suspension       FLUoxetine (PROZAC) 40 MG capsule          Review of Systems   Constitutional: Negative for chills and fever.   HENT: Negative for congestion.    Eyes: Negative for visual disturbance.   Respiratory: Negative for chest tightness and shortness of breath.    Cardiovascular: Negative for chest pain.   Gastrointestinal: Positive for abdominal pain. Negative for nausea and vomiting.   Genitourinary: Negative for hematuria.   Musculoskeletal: Negative for back pain.   Skin: Negative for rash and wound.   Neurological: Negative for syncope.   Hematological: Negative for adenopathy. Does not bruise/bleed easily.   Psychiatric/Behavioral: Negative for confusion.       Physical Exam   BP: (!) 155/54  Pulse: 82  Temp: 98.5  F (36.9  C)  Resp: 18  Height: 165.1 cm (5' 5\")  Weight: 131.5 kg (290 lb)  SpO2: 100 %      Physical Exam  Constitutional:       General: She is not in acute distress.     Appearance: She is well-developed. She is not diaphoretic.   HENT:      Head: Normocephalic and atraumatic.   Eyes:      General: No scleral icterus.     Conjunctiva/sclera: Conjunctivae normal.   Neck:      Musculoskeletal: Neck supple.   Cardiovascular:      Rate and Rhythm: Normal rate and regular rhythm.   Pulmonary:      Effort: Pulmonary effort is normal.      Breath sounds: Normal breath sounds.   Abdominal:      Palpations: Abdomen is soft.      Tenderness: There is abdominal tenderness in the left upper quadrant and left lower quadrant. There is no right CVA tenderness or left CVA tenderness.   Musculoskeletal:         General: No deformity.   Lymphadenopathy:      Cervical: No cervical adenopathy.   Skin:     General: Skin is warm and dry.      Findings: No rash.   Neurological:      Mental Status: She is alert and oriented to person, place, and time. Mental status is at baseline.   Psychiatric:         Mood " and Affect: Mood normal.         Behavior: Behavior normal.         ED Course        Procedures               Critical Care time:  none               Results for orders placed or performed during the hospital encounter of 01/18/21 (from the past 24 hour(s))   UA reflex to Microscopic   Result Value Ref Range    Color Urine Light Yellow     Appearance Urine Clear     Glucose Urine Negative NEG^Negative mg/dL    Bilirubin Urine Negative NEG^Negative    Ketones Urine Negative NEG^Negative mg/dL    Specific Gravity Urine 1.026 1.003 - 1.035    Blood Urine Trace (A) NEG^Negative    pH Urine 5.5 5.0 - 7.0 pH    Protein Albumin Urine 10 (A) NEG^Negative mg/dL    Urobilinogen mg/dL Normal 0.0 - 2.0 mg/dL    Nitrite Urine Negative NEG^Negative    Leukocyte Esterase Urine Negative NEG^Negative    Source Midstream Urine     RBC Urine 1 0 - 2 /HPF    WBC Urine 2 0 - 5 /HPF    Squamous Epithelial /HPF Urine 3 (H) 0 - 1 /HPF    Mucous Urine Present (A) NEG^Negative /LPF   HCG qualitative urine   Result Value Ref Range    HCG Qual Urine Negative NEG^Negative   CBC with platelets differential   Result Value Ref Range    WBC 6.9 4.0 - 11.0 10e9/L    RBC Count 5.41 (H) 3.8 - 5.2 10e12/L    Hemoglobin 15.0 11.7 - 15.7 g/dL    Hematocrit 47.0 35.0 - 47.0 %    MCV 87 78 - 100 fl    MCH 27.7 26.5 - 33.0 pg    MCHC 31.9 31.5 - 36.5 g/dL    RDW 13.2 10.0 - 15.0 %    Platelet Count 251 150 - 450 10e9/L    Diff Method Automated Method     % Neutrophils 67.9 %    % Lymphocytes 23.5 %    % Monocytes 6.6 %    % Eosinophils 1.6 %    % Basophils 0.3 %    % Immature Granulocytes 0.1 %    Absolute Neutrophil 4.7 1.6 - 8.3 10e9/L    Absolute Lymphocytes 1.6 0.8 - 5.3 10e9/L    Absolute Monocytes 0.5 0.0 - 1.3 10e9/L    Absolute Eosinophils 0.1 0.0 - 0.7 10e9/L    Absolute Basophils 0.0 0.0 - 0.2 10e9/L    Abs Immature Granulocytes 0.0 0 - 0.4 10e9/L   Comprehensive metabolic panel   Result Value Ref Range    Sodium 137 134 - 144 mmol/L    Potassium  3.7 3.5 - 5.1 mmol/L    Chloride 102 98 - 107 mmol/L    Carbon Dioxide 27 21 - 31 mmol/L    Anion Gap 8 3 - 14 mmol/L    Glucose 93 70 - 105 mg/dL    Urea Nitrogen 14 7 - 25 mg/dL    Creatinine 0.86 0.60 - 1.20 mg/dL    GFR Estimate 76 >60 mL/min/[1.73_m2]    GFR Estimate If Black >90 >60 mL/min/[1.73_m2]    Calcium 9.1 8.6 - 10.3 mg/dL    Bilirubin Total 0.7 0.3 - 1.0 mg/dL    Albumin 4.0 3.5 - 5.7 g/dL    Protein Total 7.6 6.4 - 8.9 g/dL    Alkaline Phosphatase 60 34 - 104 U/L    ALT 17 7 - 52 U/L    AST 17 13 - 39 U/L   Lipase   Result Value Ref Range    Lipase 34 11 - 82 U/L   Lactic acid whole blood   Result Value Ref Range    Lactic Acid 1.1 0.7 - 2.0 mmol/L   US Pelvic Complete with Transvaginal    Narrative    PROCEDURE: US PELVIC COMPLETE WITH TRANSVAGINAL  2021 6:03 PM    HISTORY: Female, age 35 years, . Left sided abdominal pain. Known  ovarian cyst. Torsion?    GYNECOLOGIC HISTORY:  , PARA ; LMP    TECHNIQUE: Transabdominal and endovaginal ultrasound of the pelvis.    COMPARISON: Pelvic ultrasound 2021    FINDINGS:     MEASUREMENTS:  Uterus: 6.5 x 4.3 x 3.8 cm (Length x Height x Width)  Endometrium: 2.8 mm in thickness  Right Ovary: 2.6 x 1.8 x 2.0 cm (Length x Height x Width) Normal blood  flow.  Left Ovary:  7.4 x 6.0 x 5.9 cm (Length x Height x Width) Normal blood  flow.    UTERUS: Normal.    ADNEXA: Complex cyst again seen in the left adnexal region associated  with the left ovary currently measuring 6.4 x 5.1 x 5.6 cm.    MISC: No free fluid.      Impression    IMPRESSION:   Large complex cyst again seen on the left ovary currently measuring  6.4 x 5.1 x 5.6 cm. Differential diagnosis includes a large  hemorrhagic cyst or perhaps an endometrioma. No evidence of torsion.    MILO PLATA MD   CT Abdomen Pelvis w Contrast    Narrative    CT ABDOMEN PELVIS W CONTRAST    CLINICAL HISTORY: Female, age 35 years, ;    Comparison:  PET CT 2020    TECHNIQUE:  CT was performed of the  abdomen and pelvis with IV  contrast. Sagittal, coronal, axial and MIP reconstructions were  reviewed.     FINDINGS:  Lower thorax: The lung bases are clear. Visualized portions of the  heart are unremarkable.    STOMACH: Moderate size hiatal hernia is similar in appearance. No  acute abnormality. The stomach and duodenum are normal.    Liver: Normal.    Gallbladder: Normal. Biliary tree is also grossly normal.    Pancreas: Normal.    Spleen: Normal.    Adrenal glands: Normal.    Kidneys: Normal.  Ureters: Normal.  Urinary bladder: Normal.    Large and small bowel: Normal.  Appendix: Normal.    Lymph nodes: Normal.    A 7.4 x 5.1 cm low dense mass is again seen in the left hemipelvis  which appears slightly larger when compared to the PET CT from  11/18/2020 at which time it measured approximate 7.2 x 5.0 cm. The  uterus and right ovary are normal. No free fluid.    Bony structures: No acute abnormality.    Inguinal lymph nodes are normal.      Impression    IMPRESSION:   Slight interval enlargement of 7.4 x 5.1 x 4.7 cm low-density left  ovarian mass when compared to the PET CT dated 11/18/2020.  Differential diagnosis favors an endometrioma. Hemorrhagic cyst would  likely have resolved since the 11/18/2020 examination. Cystic neoplasm  is also included in the differential diagnosis.    MILO PLATA MD       Medications   iohexol (OMNIPAQUE) 350 mg/mL solution 100 mL (100 mLs Intravenous Given 1/18/21 1949)       Assessments & Plan (with Medical Decision Making)   Nontoxic in no acute distress.  Heart, lung, bowel sounds are normal.  She does have some left-sided abdominal pain but without guarding or rebound.  Vital signs are stable she is afebrile.    With her known cyst that is fairly large in size with ongoing left lower quadrant pain we did obtain ultrasound to rule out a torsion.  This ultrasound was read as Large complex cyst again seen on the left ovary currently measuring  6.4 x 5.1 x 5.6 cm.  Differential diagnosis includes a large  hemorrhagic cyst or perhaps an endometrioma. No evidence of torsion.    She has very reassuring lab work.  There is a trace hematuria.  I discussed options with the patient including close follow-up versus advanced imaging today.  She would like to go ahead with a CT scan today.  CT was read as Slight interval enlargement of 7.4 x 5.1 x 4.7 cm low-density left  ovarian mass when compared to the PET CT dated 11/18/2020.  Differential diagnosis favors an endometrioma. Hemorrhagic cyst would  likely have resolved since the 11/18/2020 examination. Cystic neoplasm  is also included in the differential diagnosis.    Overall she appears to be doing very well and stable.  She is encouraged to continue with Tylenol and ibuprofen at home.  She already has a follow-up appoint with her OB/GYN, but a referral is placed in case needed if the patient's pain continues to worsen.  She is also told to return to the ED if there are her greatly worsening or concerning symptoms especially signs of blood loss such as increased shortness of breath or lightheadedness.  She understands and agrees with plan the patient is discharged.    Javed Segura PA-C    I have reviewed the nursing notes.    I have reviewed the findings, diagnosis, plan and need for follow up with the patient.       Discharge Medication List as of 1/18/2021  9:06 PM          Final diagnoses:   Left ovarian cyst   Abdominal pain, left lower quadrant       1/18/2021   [facility] AND [facility]     Javed Segura PA  01/19/21 0016

## 2021-01-19 NOTE — DISCHARGE INSTRUCTIONS
Get plenty of fluids and rest.  As we discussed it looks like your cyst on your left ovary is slightly bigger than it was before but otherwise appears to be very stable as is the rest of your lab work, imaging, vital signs and physical exam.  Referral is placed for you to follow-up with OB/GYN for reassessment and to discuss possible treatment options.  Please return the ED if you have worsening intractable pain or other symptoms such as lightheadedness, shortness of breath.

## 2021-01-22 ENCOUNTER — TRANSFERRED RECORDS (OUTPATIENT)
Dept: HEALTH INFORMATION MANAGEMENT | Facility: OTHER | Age: 36
End: 2021-01-22

## 2021-01-26 ENCOUNTER — OFFICE VISIT (OUTPATIENT)
Dept: OBGYN | Facility: OTHER | Age: 36
End: 2021-01-26
Attending: STUDENT IN AN ORGANIZED HEALTH CARE EDUCATION/TRAINING PROGRAM
Payer: COMMERCIAL

## 2021-01-26 VITALS
HEART RATE: 70 BPM | SYSTOLIC BLOOD PRESSURE: 132 MMHG | WEIGHT: 289 LBS | BODY MASS INDEX: 48.09 KG/M2 | DIASTOLIC BLOOD PRESSURE: 84 MMHG

## 2021-01-26 DIAGNOSIS — N94.89 ADNEXAL MASS: Primary | ICD-10-CM

## 2021-01-26 DIAGNOSIS — R10.32 ABDOMINAL PAIN, LEFT LOWER QUADRANT: ICD-10-CM

## 2021-01-26 DIAGNOSIS — E66.01 MORBID OBESITY (H): ICD-10-CM

## 2021-01-26 LAB
COPATH REPORT: NORMAL
PAP: NORMAL

## 2021-01-26 PROCEDURE — 99213 OFFICE O/P EST LOW 20 MIN: CPT | Performed by: STUDENT IN AN ORGANIZED HEALTH CARE EDUCATION/TRAINING PROGRAM

## 2021-01-26 ASSESSMENT — PAIN SCALES - GENERAL: PAINLEVEL: NO PAIN (0)

## 2021-01-26 NOTE — NURSING NOTE
Pt presents to clinic today for intermitting vaginal pain. Pt stated she was seen in the ER on 1/18/21 but is not having as much pain as she was on that day.     Medication Reconciliation: complete  Yessenia Underwood LPN
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
## 2021-02-18 ENCOUNTER — TRANSFERRED RECORDS (OUTPATIENT)
Dept: HEALTH INFORMATION MANAGEMENT | Facility: OTHER | Age: 36
End: 2021-02-18

## 2021-04-15 ENCOUNTER — TRANSFERRED RECORDS (OUTPATIENT)
Dept: HEALTH INFORMATION MANAGEMENT | Facility: OTHER | Age: 36
End: 2021-04-15

## 2021-09-30 ENCOUNTER — E-VISIT (OUTPATIENT)
Dept: FAMILY MEDICINE | Facility: OTHER | Age: 36
End: 2021-09-30
Payer: COMMERCIAL

## 2021-09-30 DIAGNOSIS — F32.A DEPRESSION, UNSPECIFIED DEPRESSION TYPE: Primary | ICD-10-CM

## 2021-09-30 ASSESSMENT — ANXIETY QUESTIONNAIRES
GAD7 TOTAL SCORE: 16
6. BECOMING EASILY ANNOYED OR IRRITABLE: MORE THAN HALF THE DAYS
8. IF YOU CHECKED OFF ANY PROBLEMS, HOW DIFFICULT HAVE THESE MADE IT FOR YOU TO DO YOUR WORK, TAKE CARE OF THINGS AT HOME, OR GET ALONG WITH OTHER PEOPLE?: VERY DIFFICULT
4. TROUBLE RELAXING: NEARLY EVERY DAY
5. BEING SO RESTLESS THAT IT IS HARD TO SIT STILL: MORE THAN HALF THE DAYS
GAD7 TOTAL SCORE: 16
2. NOT BEING ABLE TO STOP OR CONTROL WORRYING: MORE THAN HALF THE DAYS
3. WORRYING TOO MUCH ABOUT DIFFERENT THINGS: MORE THAN HALF THE DAYS
1. FEELING NERVOUS, ANXIOUS, OR ON EDGE: MORE THAN HALF THE DAYS
7. FEELING AFRAID AS IF SOMETHING AWFUL MIGHT HAPPEN: NEARLY EVERY DAY
GAD7 TOTAL SCORE: 16
7. FEELING AFRAID AS IF SOMETHING AWFUL MIGHT HAPPEN: NEARLY EVERY DAY

## 2021-09-30 ASSESSMENT — PATIENT HEALTH QUESTIONNAIRE - PHQ9
SUM OF ALL RESPONSES TO PHQ QUESTIONS 1-9: 16
10. IF YOU CHECKED OFF ANY PROBLEMS, HOW DIFFICULT HAVE THESE PROBLEMS MADE IT FOR YOU TO DO YOUR WORK, TAKE CARE OF THINGS AT HOME, OR GET ALONG WITH OTHER PEOPLE: VERY DIFFICULT
SUM OF ALL RESPONSES TO PHQ QUESTIONS 1-9: 16

## 2021-10-01 ASSESSMENT — ANXIETY QUESTIONNAIRES: GAD7 TOTAL SCORE: 16

## 2021-10-01 ASSESSMENT — PATIENT HEALTH QUESTIONNAIRE - PHQ9: SUM OF ALL RESPONSES TO PHQ QUESTIONS 1-9: 16

## 2021-10-07 ENCOUNTER — OFFICE VISIT (OUTPATIENT)
Dept: FAMILY MEDICINE | Facility: OTHER | Age: 36
End: 2021-10-07
Attending: FAMILY MEDICINE
Payer: COMMERCIAL

## 2021-10-07 VITALS
HEART RATE: 86 BPM | DIASTOLIC BLOOD PRESSURE: 82 MMHG | SYSTOLIC BLOOD PRESSURE: 144 MMHG | OXYGEN SATURATION: 99 % | TEMPERATURE: 99.3 F | WEIGHT: 293 LBS | BODY MASS INDEX: 51.02 KG/M2 | RESPIRATION RATE: 18 BRPM

## 2021-10-07 DIAGNOSIS — F41.9 ANXIETY AND DEPRESSION: Primary | ICD-10-CM

## 2021-10-07 DIAGNOSIS — F32.A ANXIETY AND DEPRESSION: Primary | ICD-10-CM

## 2021-10-07 DIAGNOSIS — J45.20 MILD INTERMITTENT ASTHMA WITHOUT COMPLICATION: ICD-10-CM

## 2021-10-07 PROCEDURE — 99213 OFFICE O/P EST LOW 20 MIN: CPT | Performed by: FAMILY MEDICINE

## 2021-10-07 RX ORDER — ALBUTEROL SULFATE 90 UG/1
2 AEROSOL, METERED RESPIRATORY (INHALATION) EVERY 6 HOURS PRN
Qty: 18 G | Refills: 11 | Status: SHIPPED | OUTPATIENT
Start: 2021-10-07 | End: 2023-11-29

## 2021-10-07 RX ORDER — FLUOXETINE 10 MG/1
10 CAPSULE ORAL DAILY
Qty: 7 CAPSULE | Refills: 0 | Status: SHIPPED | OUTPATIENT
Start: 2021-10-07 | End: 2021-11-05

## 2021-10-07 RX ORDER — FLUTICASONE PROPIONATE 110 UG/1
1 AEROSOL, METERED RESPIRATORY (INHALATION) 2 TIMES DAILY
Qty: 12 G | Refills: 11 | Status: SHIPPED | OUTPATIENT
Start: 2021-10-07 | End: 2023-11-29

## 2021-10-07 ASSESSMENT — ENCOUNTER SYMPTOMS
CHILLS: 0
WHEEZING: 1
COUGH: 0
FEVER: 0
NERVOUS/ANXIOUS: 1

## 2021-10-07 ASSESSMENT — ANXIETY QUESTIONNAIRES
GAD7 TOTAL SCORE: 14
GAD7 TOTAL SCORE: 14
6. BECOMING EASILY ANNOYED OR IRRITABLE: MORE THAN HALF THE DAYS
8. IF YOU CHECKED OFF ANY PROBLEMS, HOW DIFFICULT HAVE THESE MADE IT FOR YOU TO DO YOUR WORK, TAKE CARE OF THINGS AT HOME, OR GET ALONG WITH OTHER PEOPLE?: VERY DIFFICULT
7. FEELING AFRAID AS IF SOMETHING AWFUL MIGHT HAPPEN: MORE THAN HALF THE DAYS
5. BEING SO RESTLESS THAT IT IS HARD TO SIT STILL: SEVERAL DAYS
1. FEELING NERVOUS, ANXIOUS, OR ON EDGE: NEARLY EVERY DAY
4. TROUBLE RELAXING: MORE THAN HALF THE DAYS
2. NOT BEING ABLE TO STOP OR CONTROL WORRYING: MORE THAN HALF THE DAYS
7. FEELING AFRAID AS IF SOMETHING AWFUL MIGHT HAPPEN: MORE THAN HALF THE DAYS
3. WORRYING TOO MUCH ABOUT DIFFERENT THINGS: MORE THAN HALF THE DAYS
GAD7 TOTAL SCORE: 14

## 2021-10-07 ASSESSMENT — PATIENT HEALTH QUESTIONNAIRE - PHQ9
10. IF YOU CHECKED OFF ANY PROBLEMS, HOW DIFFICULT HAVE THESE PROBLEMS MADE IT FOR YOU TO DO YOUR WORK, TAKE CARE OF THINGS AT HOME, OR GET ALONG WITH OTHER PEOPLE: VERY DIFFICULT
SUM OF ALL RESPONSES TO PHQ QUESTIONS 1-9: 18
SUM OF ALL RESPONSES TO PHQ QUESTIONS 1-9: 18

## 2021-10-07 ASSESSMENT — PAIN SCALES - GENERAL: PAINLEVEL: NO PAIN (0)

## 2021-10-07 NOTE — PROGRESS NOTES
"  SUBJECTIVE:   Nursing Notes:   Deepika Tobias LPN  10/7/2021  2:51 PM  Sign at exiting of workspace  Patient here to discuss depression and anxiety.   Deepika Tobias LPN ..........10/7/2021 2:47 PM   Chief Complaint   Patient presents with     Depression     Anxiety       Initial BP (!) 144/82 (BP Location: Right arm, Patient Position: Sitting, Cuff Size: Adult Large)   Pulse 86   Temp 99.3  F (37.4  C) (Tympanic)   Resp 18   Wt 139.1 kg (306 lb 9.6 oz)   LMP 09/15/2021   SpO2 99%   BMI 51.02 kg/m   Estimated body mass index is 51.02 kg/m  as calculated from the following:    Height as of 1/18/21: 1.651 m (5' 5\").    Weight as of this encounter: 139.1 kg (306 lb 9.6 oz).  Medication Reconciliation: complete    Deepika Tobias LPN    Advance Care Directive reviewed        Bean Garcia is a 36 year old female who presents to clinic today for discussion of depression and anxiety.  She is still on fluoxetine 40 mg daily.  She has been on this for about a year.  Felt like it was working better initially.  Feeling worse over the past couple of months.  No increased stress recently.  Hard to get out of bed or to find the motivation to do things.  Not isolating herself right now.  She has not been on other medications in the past for anxiety/depression.    She also has a history of wheezing.   She has not been given a diagnosis of asthma previously.  Both of her parents have asthma.  She currently only is taking albuterol.  She has frequent wheezing, but probably takes her albuterol on average of about once per week.  She does have a dog.  When her dog gets excited and is jumping around more, she does notice that she has more wheezing and difficulty breathing.  She does have some loratadine that she takes intermittently.    HPI    I personally reviewed medications/allergies/history listed below:    Patient Active Problem List    Diagnosis Date Noted     Morbid obesity (H) 11/03/2020     Priority: Medium "     Past Medical History:   Diagnosis Date     Mild intermittent asthma without complication       History reviewed. No pertinent surgical history.  Family History   Problem Relation Age of Onset     Arthritis Mother         hip replacement     Asthma Mother      Valvular heart disease Father      Asthma Father      Diabetes Father      Depression Sister      Family History Negative Brother      Valvular heart disease Maternal Grandmother      Kidney Disease Maternal Grandfather         dialysis     Heart Disease Maternal Grandfather         valvular heart disease.     Rheumatoid Arthritis Paternal Grandmother      Heart Failure Paternal Grandfather      Social History     Tobacco Use     Smoking status: Never Smoker     Smokeless tobacco: Never Used   Substance Use Topics     Alcohol use: No     Social History     Social History Narrative    Lives with parents.  Single, no children.    Has one older brother and one older sister.    Works for [employer] in [place].      Current Outpatient Medications   Medication Sig Dispense Refill     albuterol (PROAIR HFA/PROVENTIL HFA/VENTOLIN HFA) 108 (90 Base) MCG/ACT inhaler Inhale 2 puffs into the lungs every 6 hours as needed for shortness of breath / dyspnea or wheezing 18 g 11     FLUoxetine (PROZAC) 10 MG capsule Take 1 capsule (10 mg) by mouth daily For 7 days, then stop. 7 capsule 0     FLUoxetine (PROZAC) 20 MG capsule Take 1 capsule (20 mg) by mouth daily for 7 days , then decrease to 10 mg daily. 7 capsule 0     fluticasone (FLOVENT HFA) 110 MCG/ACT inhaler Inhale 1 puff into the lungs 2 times daily 12 g 11     sertraline (ZOLOFT) 50 MG tablet Take 1/2 by mouth daily x 7 days, then increase to 1 by mouth daily. 90 tablet 3     Allergies   Allergen Reactions     Banana Nausea and Vomiting     Cats      Dogs      Dust Mites      Milk-Related Compounds Nausea and Vomiting and Diarrhea     Amoxicillin Swelling and Rash     Other reaction(s): Edema  joints       Review of  Systems   Constitutional: Negative for chills and fever.   Respiratory: Positive for wheezing. Negative for cough.    Cardiovascular: Negative for peripheral edema.   Psychiatric/Behavioral: Positive for mood changes. The patient is nervous/anxious.         OBJECTIVE:     BP (!) 144/82 (BP Location: Right arm, Patient Position: Sitting, Cuff Size: Adult Large)   Pulse 86   Temp 99.3  F (37.4  C) (Tympanic)   Resp 18   Wt 139.1 kg (306 lb 9.6 oz)   LMP 09/15/2021   SpO2 99%   BMI 51.02 kg/m    Body mass index is 51.02 kg/m .  Physical Exam  Constitutional:       Appearance: Normal appearance.   HENT:      Head: Normocephalic.   Eyes:      Extraocular Movements: Extraocular movements intact.      Pupils: Pupils are equal, round, and reactive to light.   Cardiovascular:      Rate and Rhythm: Normal rate and regular rhythm.      Pulses: Normal pulses.      Heart sounds: Normal heart sounds. No murmur heard.     Pulmonary:      Effort: Pulmonary effort is normal.      Breath sounds: Normal breath sounds. No wheezing, rhonchi or rales.   Musculoskeletal:      Cervical back: Normal range of motion and neck supple.   Lymphadenopathy:      Cervical: No cervical adenopathy.   Neurological:      Mental Status: She is alert.   Psychiatric:         Mood and Affect: Mood normal.         Behavior: Behavior normal.           PHQ-9 SCORE 12/8/2020 9/30/2021 10/7/2021   PHQ-9 Total Score MyChart - 16 (Moderately severe depression) 18 (Moderately severe depression)   PHQ-9 Total Score 11 16 18       PHQ-2 Score:     PHQ-2 ( 1999 Pf[employer]) 1/14/2021 12/8/2020   Q1: Little interest or pleasure in doing things 1 2   Q2: Feeling down, depressed or hopeless 1 1   PHQ-2 Score 2 3       LYNDA-7 SCORE 12/8/2020 9/30/2021 10/7/2021   Total Score - 16 (severe anxiety) 14 (moderate anxiety)   Total Score 9 16 14         No flowsheet data found.      I personally reviewed results withpatient as listed below:   Diagnostic Test Results:  none      ASSESSMENT/PLAN:       ICD-10-CM    1. Anxiety and depression  F41.9 sertraline (ZOLOFT) 50 MG tablet    F32.A FLUoxetine (PROZAC) 20 MG capsule     FLUoxetine (PROZAC) 10 MG capsule   2. Mild intermittent asthma without complication  J45.20 albuterol (PROAIR HFA/PROVENTIL HFA/VENTOLIN HFA) 108 (90 Base) MCG/ACT inhaler     fluticasone (FLOVENT HFA) 110 MCG/ACT inhaler       1.  Recommended transitioning from Prozac to Zoloft instead.  See directions as noted below.  Follow-up with me in approximately 1 month.    Week #1:  Take Zoloft 25 mg daily (1/2 of 50 mg) and Prozac 20 mg daily.  Week #2:  Take Zoloft 50 mg daily (full tablet) with Prozac 10 daily.  Week #3:  Stay on Zoloft 50 mg daily.  Stop Prozac.    2.  Albuterol inhaler refilled.  Given the frequency of her wheezing, recommended trial of an haled steroid as well.  Prescription for Flovent 110 mcg inhaler 1 puff twice a day recommended.    Helen Mccord MD  [facility]

## 2021-10-07 NOTE — NURSING NOTE
"Patient here to discuss depression and anxiety.   Deepika Tobias LPN ..........10/7/2021 2:47 PM   Chief Complaint   Patient presents with     Depression     Anxiety       Initial BP (!) 144/82 (BP Location: Right arm, Patient Position: Sitting, Cuff Size: Adult Large)   Pulse 86   Temp 99.3  F (37.4  C) (Tympanic)   Resp 18   Wt 139.1 kg (306 lb 9.6 oz)   LMP 09/15/2021   SpO2 99%   BMI 51.02 kg/m   Estimated body mass index is 51.02 kg/m  as calculated from the following:    Height as of 1/18/21: 1.651 m (5' 5\").    Weight as of this encounter: 139.1 kg (306 lb 9.6 oz).  Medication Reconciliation: complete    Deepika Tobias LPN    Advance Care Directive reviewed    "

## 2021-10-07 NOTE — PATIENT INSTRUCTIONS
Week #1:  Take Zoloft 25 mg daily (1/2 of 50 mg) and Prozac 20 mg daily.  Week #2:  Take Zoloft 50 mg daily (full tablet) with Prozac 10 daily.  Week #3:  Stay on Zoloft 50 mg daily.  Stop Prozac.

## 2021-10-08 ENCOUNTER — MYC MEDICAL ADVICE (OUTPATIENT)
Dept: FAMILY MEDICINE | Facility: OTHER | Age: 36
End: 2021-10-08

## 2021-10-08 DIAGNOSIS — F51.02 ADJUSTMENT INSOMNIA: Primary | ICD-10-CM

## 2021-10-08 ASSESSMENT — PATIENT HEALTH QUESTIONNAIRE - PHQ9: SUM OF ALL RESPONSES TO PHQ QUESTIONS 1-9: 18

## 2021-10-08 ASSESSMENT — ANXIETY QUESTIONNAIRES: GAD7 TOTAL SCORE: 14

## 2021-10-11 RX ORDER — TRAZODONE HYDROCHLORIDE 50 MG/1
50 TABLET, FILM COATED ORAL AT BEDTIME
Qty: 90 TABLET | Refills: 3 | Status: SHIPPED | OUTPATIENT
Start: 2021-10-11 | End: 2023-11-29

## 2021-11-04 PROBLEM — N83.202 LEFT OVARIAN CYST: Status: ACTIVE | Noted: 2021-02-18

## 2021-11-05 ENCOUNTER — OFFICE VISIT (OUTPATIENT)
Dept: FAMILY MEDICINE | Facility: OTHER | Age: 36
End: 2021-11-05
Attending: FAMILY MEDICINE
Payer: COMMERCIAL

## 2021-11-05 VITALS
BODY MASS INDEX: 48.82 KG/M2 | HEART RATE: 86 BPM | DIASTOLIC BLOOD PRESSURE: 70 MMHG | HEIGHT: 65 IN | SYSTOLIC BLOOD PRESSURE: 124 MMHG | RESPIRATION RATE: 20 BRPM | WEIGHT: 293 LBS | TEMPERATURE: 97.3 F | OXYGEN SATURATION: 98 %

## 2021-11-05 DIAGNOSIS — Z13.1 SCREENING FOR DIABETES MELLITUS: ICD-10-CM

## 2021-11-05 DIAGNOSIS — Z13.0 SCREENING FOR DEFICIENCY ANEMIA: ICD-10-CM

## 2021-11-05 DIAGNOSIS — Z23 NEED FOR HPV VACCINE: ICD-10-CM

## 2021-11-05 DIAGNOSIS — Z23 NEED FOR TDAP VACCINATION: ICD-10-CM

## 2021-11-05 DIAGNOSIS — Z13.220 SCREENING FOR LIPID DISORDERS: ICD-10-CM

## 2021-11-05 DIAGNOSIS — Z00.00 HEALTH CARE MAINTENANCE: Primary | ICD-10-CM

## 2021-11-05 DIAGNOSIS — R06.83 SNORING: ICD-10-CM

## 2021-11-05 DIAGNOSIS — F41.9 ANXIETY AND DEPRESSION: ICD-10-CM

## 2021-11-05 DIAGNOSIS — F32.A ANXIETY AND DEPRESSION: ICD-10-CM

## 2021-11-05 DIAGNOSIS — Z13.29 SCREENING FOR THYROID DISORDER: ICD-10-CM

## 2021-11-05 LAB
ALBUMIN SERPL-MCNC: 3.8 G/DL (ref 3.5–5.7)
ALP SERPL-CCNC: 59 U/L (ref 34–104)
ALT SERPL W P-5'-P-CCNC: 15 U/L (ref 7–52)
ANION GAP SERPL CALCULATED.3IONS-SCNC: 5 MMOL/L (ref 3–14)
AST SERPL W P-5'-P-CCNC: 17 U/L (ref 13–39)
BASOPHILS # BLD AUTO: 0 10E3/UL (ref 0–0.2)
BASOPHILS NFR BLD AUTO: 0 %
BILIRUB SERPL-MCNC: 0.6 MG/DL (ref 0.3–1)
BUN SERPL-MCNC: 13 MG/DL (ref 7–25)
CALCIUM SERPL-MCNC: 9.2 MG/DL (ref 8.6–10.3)
CHLORIDE BLD-SCNC: 104 MMOL/L (ref 98–107)
CHOLEST SERPL-MCNC: 256 MG/DL
CO2 SERPL-SCNC: 30 MMOL/L (ref 21–31)
CREAT SERPL-MCNC: 0.87 MG/DL (ref 0.6–1.2)
EOSINOPHIL # BLD AUTO: 0.4 10E3/UL (ref 0–0.7)
EOSINOPHIL NFR BLD AUTO: 5 %
ERYTHROCYTE [DISTWIDTH] IN BLOOD BY AUTOMATED COUNT: 13.6 % (ref 10–15)
FASTING STATUS PATIENT QL REPORTED: ABNORMAL
GFR SERPL CREATININE-BSD FRML MDRD: 87 ML/MIN/1.73M2
GLUCOSE BLD-MCNC: 90 MG/DL (ref 70–105)
HCT VFR BLD AUTO: 41.6 % (ref 35–47)
HDLC SERPL-MCNC: 43 MG/DL (ref 23–92)
HGB BLD-MCNC: 13.5 G/DL (ref 11.7–15.7)
IMM GRANULOCYTES # BLD: 0 10E3/UL
IMM GRANULOCYTES NFR BLD: 0 %
LDLC SERPL CALC-MCNC: 181 MG/DL
LYMPHOCYTES # BLD AUTO: 1.8 10E3/UL (ref 0.8–5.3)
LYMPHOCYTES NFR BLD AUTO: 22 %
MCH RBC QN AUTO: 28.4 PG (ref 26.5–33)
MCHC RBC AUTO-ENTMCNC: 32.5 G/DL (ref 31.5–36.5)
MCV RBC AUTO: 87 FL (ref 78–100)
MONOCYTES # BLD AUTO: 0.5 10E3/UL (ref 0–1.3)
MONOCYTES NFR BLD AUTO: 6 %
NEUTROPHILS # BLD AUTO: 5.3 10E3/UL (ref 1.6–8.3)
NEUTROPHILS NFR BLD AUTO: 67 %
NONHDLC SERPL-MCNC: 213 MG/DL
NRBC # BLD AUTO: 0 10E3/UL
NRBC BLD AUTO-RTO: 0 /100
PLATELET # BLD AUTO: 219 10E3/UL (ref 150–450)
POTASSIUM BLD-SCNC: 3.8 MMOL/L (ref 3.5–5.1)
PROT SERPL-MCNC: 7 G/DL (ref 6.4–8.9)
RBC # BLD AUTO: 4.76 10E6/UL (ref 3.8–5.2)
SODIUM SERPL-SCNC: 139 MMOL/L (ref 134–144)
TRIGL SERPL-MCNC: 160 MG/DL
TSH SERPL DL<=0.005 MIU/L-ACNC: 1.4 MU/L (ref 0.4–4)
WBC # BLD AUTO: 8 10E3/UL (ref 4–11)

## 2021-11-05 PROCEDURE — C9803 HOPD COVID-19 SPEC COLLECT: HCPCS

## 2021-11-05 PROCEDURE — 80061 LIPID PANEL: CPT | Mod: ZL | Performed by: FAMILY MEDICINE

## 2021-11-05 PROCEDURE — 90471 IMMUNIZATION ADMIN: CPT | Performed by: FAMILY MEDICINE

## 2021-11-05 PROCEDURE — 90472 IMMUNIZATION ADMIN EACH ADD: CPT | Performed by: FAMILY MEDICINE

## 2021-11-05 PROCEDURE — 82040 ASSAY OF SERUM ALBUMIN: CPT | Mod: ZL | Performed by: FAMILY MEDICINE

## 2021-11-05 PROCEDURE — 36415 COLL VENOUS BLD VENIPUNCTURE: CPT | Mod: ZL | Performed by: FAMILY MEDICINE

## 2021-11-05 PROCEDURE — 85025 COMPLETE CBC W/AUTO DIFF WBC: CPT | Mod: ZL | Performed by: FAMILY MEDICINE

## 2021-11-05 PROCEDURE — 99395 PREV VISIT EST AGE 18-39: CPT | Mod: 25 | Performed by: FAMILY MEDICINE

## 2021-11-05 PROCEDURE — 84443 ASSAY THYROID STIM HORMONE: CPT | Mod: ZL | Performed by: FAMILY MEDICINE

## 2021-11-05 PROCEDURE — 90715 TDAP VACCINE 7 YRS/> IM: CPT | Performed by: FAMILY MEDICINE

## 2021-11-05 PROCEDURE — 90651 9VHPV VACCINE 2/3 DOSE IM: CPT | Performed by: FAMILY MEDICINE

## 2021-11-05 ASSESSMENT — ANXIETY QUESTIONNAIRES
7. FEELING AFRAID AS IF SOMETHING AWFUL MIGHT HAPPEN: SEVERAL DAYS
2. NOT BEING ABLE TO STOP OR CONTROL WORRYING: NOT AT ALL
5. BEING SO RESTLESS THAT IT IS HARD TO SIT STILL: NOT AT ALL
GAD7 TOTAL SCORE: 3
6. BECOMING EASILY ANNOYED OR IRRITABLE: NOT AT ALL
1. FEELING NERVOUS, ANXIOUS, OR ON EDGE: SEVERAL DAYS
3. WORRYING TOO MUCH ABOUT DIFFERENT THINGS: SEVERAL DAYS

## 2021-11-05 ASSESSMENT — PATIENT HEALTH QUESTIONNAIRE - PHQ9
5. POOR APPETITE OR OVEREATING: NOT AT ALL
SUM OF ALL RESPONSES TO PHQ QUESTIONS 1-9: 8

## 2021-11-05 ASSESSMENT — PAIN SCALES - GENERAL: PAINLEVEL: NO PAIN (0)

## 2021-11-05 ASSESSMENT — MIFFLIN-ST. JEOR: SCORE: 2077.66

## 2021-11-05 NOTE — PROGRESS NOTES
SUBJECTIVE:   Nursing Notes:   Phoebe Crane RN  11/5/2021  3:52 PM  Sign at exiting of workspace  Patient presents for annual physical.  Phoebe Crane RN on 11/5/2021 at 3:51 PM        Bean Garcia is a 36 year old female who presents to clinic today for a physical.    Not sleeping well.  Her mind races and she has a hard time falling asleep.  She had tried trazodone, but didn't help much.  Some nights she is up multiple times even if she does fall asleep.  Her mom, sister and brother all have obstructive sleep apnea.  Bean does snore.  Her dad thinks she might be having apneic episodes.  She does have headaches sometimes in the morning on awakening.  Doesn't fall asleep when watching tv or reading.  Doesn't feel drowsy while driving.  Often feels tired during the day.        HPI    I personally reviewed medications/allergies/history listed below:    Patient Active Problem List    Diagnosis Date Noted     Left ovarian cyst 02/18/2021     Priority: Medium     Formatting of this note might be different from the original.  Added automatically from request for surgery [id number]       Morbid obesity (H) 11/03/2020     Priority: Medium     Past Medical History:   Diagnosis Date     Mild intermittent asthma without complication       History reviewed. No pertinent surgical history.  Family History   Problem Relation Age of Onset     Arthritis Mother         hip replacement     Asthma Mother      Valvular heart disease Father      Asthma Father      Diabetes Father      Depression Sister      Family History Negative Brother      Valvular heart disease Maternal Grandmother      Kidney Disease Maternal Grandfather         dialysis     Heart Disease Maternal Grandfather         valvular heart disease.     Rheumatoid Arthritis Paternal Grandmother      Heart Failure Paternal Grandfather      Social History     Tobacco Use     Smoking status: Never Smoker     Smokeless tobacco: Never Used   Substance Use Topics      "Alcohol use: No     Social History     Social History Narrative    Lives with parents.  Single, no children.    Has one older brother and one older sister.    Works for [employer] in [employer].      Current Outpatient Medications   Medication Sig Dispense Refill     albuterol (PROAIR HFA/PROVENTIL HFA/VENTOLIN HFA) 108 (90 Base) MCG/ACT inhaler Inhale 2 puffs into the lungs every 6 hours as needed for shortness of breath / dyspnea or wheezing 18 g 11     fluticasone (FLOVENT HFA) 110 MCG/ACT inhaler Inhale 1 puff into the lungs 2 times daily 12 g 11     sertraline (ZOLOFT) 50 MG tablet Take 1 tablet (50 mg) by mouth daily 90 tablet 3     traZODone (DESYREL) 50 MG tablet Take 1 tablet (50 mg) by mouth At Bedtime 90 tablet 3     Allergies   Allergen Reactions     Banana Nausea and Vomiting     Cats      Dogs      Dust Mites      Milk-Related Compounds Nausea and Vomiting and Diarrhea     Amoxicillin Swelling and Rash     Other reaction(s): Edema  joints       Review of Systems   Constitutional: Positive for fatigue. Negative for chills and fever.   Respiratory: Negative for cough and shortness of breath.    Psychiatric/Behavioral: Positive for sleep disturbance.        OBJECTIVE:     /70 (BP Location: Right arm, Patient Position: Sitting, Cuff Size: Adult Large)   Pulse 86   Temp 97.3  F (36.3  C) (Tympanic)   Resp 20   Ht 1.645 m (5' 4.75\")   Wt 138.1 kg (304 lb 6.4 oz)   LMP 10/31/2021 (Exact Date)   SpO2 98%   Breastfeeding No   BMI 51.05 kg/m    Body mass index is 51.05 kg/m .  Physical Exam  Constitutional:       General: She is not in acute distress.     Appearance: She is well-developed.   HENT:      Head: Normocephalic.      Right Ear: Tympanic membrane and external ear normal.      Left Ear: Tympanic membrane and external ear normal.      Nose: Nose normal.      Mouth/Throat:      Pharynx: No oropharyngeal exudate.   Eyes:      General:         Right eye: No discharge.         Left eye: No discharge. "      Conjunctiva/sclera: Conjunctivae normal.      Pupils: Pupils are equal, round, and reactive to light.   Neck:      Thyroid: No thyromegaly.      Trachea: No tracheal deviation.   Cardiovascular:      Rate and Rhythm: Normal rate and regular rhythm.      Pulses: Normal pulses.      Heart sounds: Normal heart sounds, S1 normal and S2 normal. No murmur heard.  No friction rub. No gallop. No S3 or S4 sounds.    Pulmonary:      Effort: Pulmonary effort is normal. No respiratory distress.      Breath sounds: Normal breath sounds. No wheezing or rales.      Comments: Breast exam:  No masses palpable bilaterally.  No skin changes, tethering or axillary lymphadenopathy bilaterally.    Abdominal:      General: Bowel sounds are normal. There is no distension.      Palpations: Abdomen is soft. There is no mass.      Tenderness: There is no abdominal tenderness.   Genitourinary:     Comments: Pelvic/Rectal exams deferred per patient.  Musculoskeletal:         General: Normal range of motion.      Cervical back: Neck supple.   Lymphadenopathy:      Cervical: No cervical adenopathy.   Skin:     General: Skin is warm and dry.      Findings: No rash.   Neurological:      Mental Status: She is alert and oriented to person, place, and time.      Motor: No abnormal muscle tone.      Deep Tendon Reflexes: Reflexes are normal and symmetric.   Psychiatric:         Thought Content: Thought content normal.         Judgment: Judgment normal.           PHQ-9 SCORE 9/30/2021 10/7/2021 11/5/2021   PHQ-9 Total Score MyChart 16 (Moderately severe depression) 18 (Moderately severe depression) -   PHQ-9 Total Score 16 18 8       PHQ-2 Score:     PHQ-2 ( 1999 Pfizer) 1/14/2021 12/8/2020   Q1: Little interest or pleasure in doing things 1 2   Q2: Feeling down, depressed or hopeless 1 1   PHQ-2 Score 2 3       LYNDA-7 SCORE 9/30/2021 10/7/2021 11/5/2021   Total Score 16 (severe anxiety) 14 (moderate anxiety) -   Total Score 16 14 3         ACT  Total Scores 11/5/2021   ACT TOTAL SCORE (Goal Greater than or Equal to 20) 21   In the past 12 months, how many times did you visit the emergency room for your asthma without being admitted to the hospital? 0   In the past 12 months, how many times were you hospitalized overnight because of your asthma? 0         I personally reviewed results withpatient as listed below:   Diagnostic Test Results:  none     ASSESSMENT/PLAN:       ICD-10-CM    1. Health care maintenance  Z00.00    2. Snoring  R06.83 SLEEP EVALUATION & MANAGEMENT REFERRAL - ADULT -   3. Anxiety and depression  F41.9 sertraline (ZOLOFT) 50 MG tablet    F32.A    4. Need for Tdap vaccination  Z23 GH IMM - TDAP (ADACEL, BOOSTRIX)   5. Need for HPV vaccine  Z23 GH IMM-  HUMAN PAPILLOMA VIRUS (GARDASIL 9) VACCINE   6. Screening for lipid disorders  Z13.220 Lipid Profile     Lipid Profile   7. Screening for diabetes mellitus  Z13.1 Comprehensive metabolic panel     Comprehensive metabolic panel   8. Screening for thyroid disorder  Z13.29 TSH with free T4 reflex     TSH with free T4 reflex   9. Screening for deficiency anemia  Z13.0 CBC with Platelets & Differential     CBC with Platelets & Differential       1.  Pap is up-to-date, last completed 1/14/2021 and was normal.  She has completed the Covid vaccine series.  Tdap is updated today as it was last documented completed in 1999.  Flu shot is already up-to-date for the season.  She did have varicella as a child.  2.  Referred for sleep medicine evaluation.  3.  Improved with current dose of Zoloft.  She wishes to stay on this dose for now.  4.  Tdap updated today.  5.  She is also due for another Gardasil vaccine and this was updated at this time.  She will need 1 additional dose.  6.  Fasting lipid profile completed today.  7.  Comprehensive metabolic profile completed today.  She is fasting today.  8.  TSH completed today as above.  9.  CBC completed today as above.    Helen Mccord MD  [facility]  [facility]

## 2021-11-06 ASSESSMENT — ANXIETY QUESTIONNAIRES: GAD7 TOTAL SCORE: 3

## 2021-11-06 ASSESSMENT — ASTHMA QUESTIONNAIRES: ACT_TOTALSCORE: 21

## 2021-11-07 ASSESSMENT — ENCOUNTER SYMPTOMS
SHORTNESS OF BREATH: 0
FATIGUE: 1
CHILLS: 0
FEVER: 0
SLEEP DISTURBANCE: 1
COUGH: 0

## 2021-11-12 ENCOUNTER — MYC MEDICAL ADVICE (OUTPATIENT)
Dept: FAMILY MEDICINE | Facility: OTHER | Age: 36
End: 2021-11-12
Payer: COMMERCIAL

## 2021-11-12 NOTE — TELEPHONE ENCOUNTER
Can AFR check status of referral for sleep.   Maribell Shepard LPN .............11/12/2021     9:25 AM

## 2021-11-16 ENCOUNTER — DOCUMENTATION ONLY (OUTPATIENT)
Dept: SLEEP MEDICINE | Facility: HOSPITAL | Age: 36
End: 2021-11-16
Payer: COMMERCIAL

## 2021-11-16 DIAGNOSIS — G47.33 OSA (OBSTRUCTIVE SLEEP APNEA): Primary | ICD-10-CM
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
## 2021-11-17 NOTE — PROGRESS NOTES
"Chart review prior to sleep testing.    Patient Summary:  36 year old yo female who is referred for observed apnea, chronic insomnia.    Patient Active Problem List    Diagnosis Date Noted     Left ovarian cyst 02/18/2021     Priority: Medium     Formatting of this note might be different from the original.  Added automatically from request for surgery [id number]       Morbid obesity (H) 11/03/2020     Priority: Medium       Current Outpatient Medications   Medication     albuterol (PROAIR HFA/PROVENTIL HFA/VENTOLIN HFA) 108 (90 Base) MCG/ACT inhaler     fluticasone (FLOVENT HFA) 110 MCG/ACT inhaler     sertraline (ZOLOFT) 50 MG tablet     traZODone (DESYREL) 50 MG tablet     No current facility-administered medications for this visit.     Pertinent PMHx of obesity, anxiety, depression.    Reviewed visit with Dr. Mccord on 11/5/2021.  Concerns for insomnia, observed apnea.  HPI copied below:  Not sleeping well.  Her mind races and she has a hard time falling asleep.  She had tried trazodone, but didn't help much.  Some nights she is up multiple times even if she does fall asleep.  Her mom, sister and brother all have obstructive sleep apnea.  Bean does snore.  Her dad thinks she might be having apneic episodes.  She does have headaches sometimes in the morning on awakening.  Doesn't fall asleep when watching tv or reading.  Doesn't feel drowsy while driving.  Often feels tired during the day.    STOP-BANG score of 4, with unknown neck circumference.  Lagrange score of 6.  BMI of Estimated body mass index is 51.05 kg/m  as calculated from the following:    Height as of 11/5/21: 1.645 m (5' 4.75\").    Weight as of 11/5/21: 138.1 kg (304 lb 6.4 oz).     Per questionnaire: \"My doctor gave me trazodone to help fall asleep but it's not working. My mind races and cannot fall asleep. My roommate have heard me stop breathing while sleeping.\"    Caffeine use:  No for 3+ per day.  No for within 6 hours of bed.    Tobacco " use: No    Do you read in bed? YES     Do you eat in bed? NO     Do you watch TV in bed? NO     Do you do work in bed? NO     Do you use a computer or phone in bed? YES    Sleep pattern:  Workdays.  10pm - 8am, total sleep time 2-3 hours.  Weekends.  10pm - 8am, total sleep time 3-4 hours.  Time to fall asleep: ~2-4 hours.  Awakenings: 2-3 times per night, 30-60 minutes to return to sleep, awake for total of 3-4 hours.  Napping.  0 days per week, - hours per nap.    Yes for RLS screen.  No for sleep walking.  No for dream enactment behavior.  Yes for bruxism.    Yes for morning headaches.  Yes for snoring.  Yes for observed apnea.  Yes for FHx of KATE in mother / brother / sister.    SHx:  Single, lives with parents.  Works for [employer].    A/P:  1.)  High likelihood of KATE with STOP-BANG score of 4.  2.)  Increased risk for hypoventilation given BMI ~51  - Given BMI > 45, recommend in-lab PSG with end-tidal capnography with pre-study VBG    3.)  Sleep onset and maintenance insomnia    - Likely multi-factorial with components of possible RLS, possible sleep-state misperception, psychophysiological (excessive time in bed), possible delayed sleep phase.   - Collect more details in regards to sleep behaviors at follow-up, further history in regards to potential RLS.    ---  This note was written with the assistance of the Dragon voice-dictation technology software. The final document, although reviewed, may contain errors. For corrections, please contact the office.    Chris Beasley MD    Sleep Medicine  [facility]3]  ([phone])  [facility]  ([phone])

## 2021-11-17 NOTE — PROGRESS NOTES
STOP BANG       Name: Bean Garcia MRN# [id number]   Age: 36 year old YOB: 1985     Stop Bang questionnaire completed with a score of >3 to allow for HST     Have you been told you snore loudly (louder than talking or loud enough to be heard through doors)? YES    Do you often feel tired, fatigued, or sleepy during the daytime? YES    Has anyone observed you stop breathing during your sleep? YES    Do you have or are you being treated for high blood pressure? NO    Is your BMI greater than 35? YES    Is your neck size circumference 16 inches or greater? unknown    Are you over 50 years old? NO    Stop Bang Score (# of yes): 4

## 2021-11-23 ENCOUNTER — TELEPHONE (OUTPATIENT)
Dept: SLEEP MEDICINE | Facility: HOSPITAL | Age: 36
End: 2021-11-23
Payer: COMMERCIAL

## 2021-11-23 DIAGNOSIS — G47.33 OSA (OBSTRUCTIVE SLEEP APNEA): Primary | ICD-10-CM

## 2021-12-01 NOTE — PROGRESS NOTES
Follow-Up Visit    S: Ms. Bean Garcia is a 36 year old  here for follow up after a robotic-assisted left ovarian cystectomy on 3/22/2021. Frozen pathology revealed a ovarian cystadenofibroma. She notes her postoperative recovery was uncomplicated and she continues to feel well.     O:  /78   Pulse 68   Wt 139.7 kg (308 lb)   BMI 51.65 kg/m      Gen: Well-appearing, NAD  Pulm: nonlabored    Pelvic:  Deferred as she is feeling well- plan for pelvic US to follow up and assess ovaries    A/P:  Ms. Bean Garcia is a 36 year old  here for a robotic-assisted left ovarian cystectomy on 2021. The pathology revealed a ovarian cystadenofibroma. She has recovered well from her surgery.    -- Follow up pelvic US ordered to assess ovaries after surgery.    We discussed that this etiology is not on the malignancy spectrum and no additional surveillance or treatment is needed. She will present if she has any new concerns.    She will return for annual pelvic exams as part of routine well-woman care.  Total amount of time spent during today's encounter with chart prep, face to face and documentation was 15 minutes    NAVNEET PEARSON MD on 2021 at 9:14 AM

## 2021-12-02 ENCOUNTER — ALLIED HEALTH/NURSE VISIT (OUTPATIENT)
Dept: FAMILY MEDICINE | Facility: OTHER | Age: 36
End: 2021-12-02
Attending: STUDENT IN AN ORGANIZED HEALTH CARE EDUCATION/TRAINING PROGRAM
Payer: COMMERCIAL

## 2021-12-02 ENCOUNTER — OFFICE VISIT (OUTPATIENT)
Dept: OBGYN | Facility: OTHER | Age: 36
End: 2021-12-02
Attending: STUDENT IN AN ORGANIZED HEALTH CARE EDUCATION/TRAINING PROGRAM
Payer: COMMERCIAL

## 2021-12-02 VITALS
DIASTOLIC BLOOD PRESSURE: 78 MMHG | BODY MASS INDEX: 51.65 KG/M2 | SYSTOLIC BLOOD PRESSURE: 130 MMHG | WEIGHT: 293 LBS | HEART RATE: 68 BPM

## 2021-12-02 DIAGNOSIS — Z98.890 HISTORY OF OVARIAN CYSTECTOMY: Primary | ICD-10-CM

## 2021-12-02 DIAGNOSIS — Z87.42 HISTORY OF OVARIAN CYSTECTOMY: Primary | ICD-10-CM

## 2021-12-02 DIAGNOSIS — Z20.822 COVID-19 RULED OUT: Primary | ICD-10-CM

## 2021-12-02 PROCEDURE — 99212 OFFICE O/P EST SF 10 MIN: CPT | Performed by: STUDENT IN AN ORGANIZED HEALTH CARE EDUCATION/TRAINING PROGRAM

## 2021-12-02 PROCEDURE — U0005 INFEC AGEN DETEC AMPLI PROBE: HCPCS | Mod: ZL

## 2021-12-02 ASSESSMENT — PAIN SCALES - GENERAL: PAINLEVEL: NO PAIN (0)

## 2021-12-02 NOTE — NURSING NOTE
"Chief Complaint   Patient presents with     RECHECK     yearly cyst removal check. had removed in [place]        Initial /78   Pulse 68   Wt 139.7 kg (308 lb)   BMI 51.65 kg/m   Estimated body mass index is 51.65 kg/m  as calculated from the following:    Height as of 11/5/21: 1.645 m (5' 4.75\").    Weight as of this encounter: 139.7 kg (308 lb).  Medication Reconciliation: complete    FOOD SECURITY SCREENING QUESTIONS  Hunger Vital Signs:  Within the past 12 months we worried whether our food would run out before we got money to buy more. Never  Within the past 12 months the food we bought just didn't last and we didn't have money to get more. Never  Vielka Amaya LPN 12/2/2021 8:58 AM             Vielka Amaya LPN  "

## 2021-12-03 LAB — SARS-COV-2 RNA RESP QL NAA+PROBE: NEGATIVE

## 2021-12-03 ASSESSMENT — PATIENT HEALTH QUESTIONNAIRE - PHQ9: SUM OF ALL RESPONSES TO PHQ QUESTIONS 1-9: 13

## 2021-12-06 ENCOUNTER — MYC MEDICAL ADVICE (OUTPATIENT)
Dept: PHYSICAL THERAPY | Facility: HOSPITAL | Age: 36
End: 2021-12-06
Payer: COMMERCIAL

## 2021-12-10 ENCOUNTER — HOSPITAL ENCOUNTER (OUTPATIENT)
Dept: ULTRASOUND IMAGING | Facility: OTHER | Age: 36
Discharge: HOME | End: 2021-12-10
Attending: STUDENT IN AN ORGANIZED HEALTH CARE EDUCATION/TRAINING PROGRAM | Admitting: STUDENT IN AN ORGANIZED HEALTH CARE EDUCATION/TRAINING PROGRAM
Payer: COMMERCIAL

## 2021-12-10 DIAGNOSIS — Z87.42 HISTORY OF OVARIAN CYSTECTOMY: ICD-10-CM

## 2021-12-10 DIAGNOSIS — Z98.890 HISTORY OF OVARIAN CYSTECTOMY: ICD-10-CM

## 2021-12-10 PROCEDURE — 76830 TRANSVAGINAL US NON-OB: CPT

## 2021-12-25 ENCOUNTER — E-VISIT (OUTPATIENT)
Dept: URGENT CARE | Facility: CLINIC | Age: 36
End: 2021-12-25
Payer: COMMERCIAL

## 2021-12-25 DIAGNOSIS — Z11.52 ENCOUNTER FOR SCREENING FOR COVID-19: Primary | ICD-10-CM

## 2021-12-25 PROCEDURE — 99421 OL DIG E/M SVC 5-10 MIN: CPT | Performed by: PHYSICIAN ASSISTANT

## 2021-12-25 NOTE — PATIENT INSTRUCTIONS
"  Dear Bean Garcia,    I have placed an order for your COVID test.    How to schedule:  Go to your [employer] home page and scroll down to the section that says  You have an appointment that needs to be scheduled  and click the large green button that says  Schedule Now  and follow the steps to find the next available opening.     If you are unable to complete these [employer] scheduling steps, please call [phone] to schedule your testing.     Return to work/school/ guidance:   For people with high risk exposures outside the home    Please let your workplace manager and staffing office know when your quarantine ends.     We can not give you an exact date as it depends on the information below. You can calculate this on your own or work with your manager/staffing office to calculate this. (For example if you were exposed on 10/4, you would have to quarantine for 14 full days. That would be through 10/18. You could return on 10/19.)    Quarantine Guidelines:  Patients (\"contacts\") who have been in close prolonged contact of an infected person(s) (within six feet for at least 15 minutes within a 24 hour period), and remain asymptomatic should enter quarantine based on the following options:    14-day quarantine period (this remains the CDC recommendation for the greatest protection against spread of COVID-19) OR    Minimum 7-day quarantine with negative RT-PCR test collected on day 5 or later OR    10-day quarantine with no test  Quarantine Guideline exceptions are as follows:    People who have been fully vaccinated do not need to quarantine if the exposure was at least 2 weeks after the last vaccination. This includes vaccinated health care workers.    Not fully vaccinated and unvaccinated Individuals who work in health care, congregate care, or congregate living should be off work for 14 days from their last date of exposure. Community activities for this group can be resumed based on options above. Fully " vaccinated individuals in this group do not need to quarantine from work after exposure.    Not fully vaccinated and unvaccinated people whose high-risk exposure was a household member should always quarantine for 14 days from their last date of exposure. Fully vaccinated people in this category do not need to quarantine.    Not fully vaccinated or unvaccinated residents of congregate care and congregate living settings should always quarantine for 14 days from their last date of exposure. Fully vaccinated residents do not need to quarantine.  Note: If you have ongoing exposure to the covid positive person, this quarantine period may be more than 14 days. (For example, if you are continued to be exposed to your child who tested positive and cannot isolate from them, then the quarantine of 7-14 days can't start until your child is no longer contagious. This is typically 10 days from onset of the child's symptoms. So the total duration may be 17-24 days in this case.)    You should continue symptom monitoring until day 14 post-exposure. If you develop signs or symptoms of COVID-19, isolate and get tested (even if you have been tested already).    How to quarantine:   Stay home and away from others. Don't go to school or anywhere else. Generally quarantine means staying home from work but there are some exceptions to this. Please contact your workplace.  No hugging, kissing or shaking hands.  Don't let anyone visit.  Cover your mouth and nose with a mask, tissue or washcloth to avoid spreading germs.  Wash your hands and face often. Use soap and water.    What are the symptoms of COVID-19?  The most common symptoms are cough, fever and trouble breathing. Less common symptoms include headache, body aches, fatigue (feeling very tired), chills, sore throat, stuffy or runny nose, diarrhea (loose poop), loss of taste or smell, belly pain, and nausea or vomiting (feeling sick to your stomach or throwing up).  After 14 days,  if you have still don't have symptoms, you likely don't have this virus.  If you develop symptoms, follow these guidelines.  If you're normally healthy: Please start another eVisit.  If you have a serious health problem (like cancer, heart failure, an organ transplant or kidney disease): Call your specialty clinic. Let them know that you might have COVID-19.    Where can I get more information?  [facility] - About COVID-19: [email or web address]  CDC - What to Do If You're Sick: www.cdc.gov/coronavirus/2019-ncov/about/steps-when-sick.html  CDC - Ending Home Isolation: www.cdc.gov/coronavirus/2019-ncov/hcp/disposition-in-home-patients.html  CDC - Caring for Someone: www.cdc.gov/coronavirus/2019-ncov/if-you-are-sick/care-for-someone.html  [facility] clinical trials (COVID-19 research studies): [email or web address]-clinical-trials  Below are the COVID-19 hotlines at the [facility] ([facility]). Interpreters are available.  For health questions: Call 271-872-4624 or 1-102.985.6755 (7 a.m. to 7 p.m.)  For questions about schools and childcare: Call 844-043-3760 or 1-560.766.2337 (7 a.m. to 7 p.m.)

## 2021-12-29 NOTE — PROGRESS NOTES
Patient swabbed for COVID-19 testing.  Sleep test 1/3/21  Valencia Castro MA on 12/29/2021 at 8:48 AM

## 2021-12-30 ENCOUNTER — ALLIED HEALTH/NURSE VISIT (OUTPATIENT)
Dept: FAMILY MEDICINE | Facility: OTHER | Age: 36
End: 2021-12-30
Attending: FAMILY MEDICINE
Payer: COMMERCIAL

## 2021-12-30 DIAGNOSIS — Z20.822 COVID-19 RULED OUT: Primary | ICD-10-CM

## 2021-12-30 PROCEDURE — C9803 HOPD COVID-19 SPEC COLLECT: HCPCS

## 2021-12-30 PROCEDURE — U0005 INFEC AGEN DETEC AMPLI PROBE: HCPCS | Mod: ZL

## 2021-12-31 LAB — SARS-COV-2 RNA RESP QL NAA+PROBE: NEGATIVE

## 2022-01-03 ENCOUNTER — THERAPY VISIT (OUTPATIENT)
Dept: SLEEP MEDICINE | Facility: HOSPITAL | Age: 37
End: 2022-01-03
Attending: FAMILY MEDICINE
Payer: COMMERCIAL

## 2022-01-03 DIAGNOSIS — G47.33 OSA (OBSTRUCTIVE SLEEP APNEA): Primary | ICD-10-CM

## 2022-01-03 DIAGNOSIS — G47.33 OSA (OBSTRUCTIVE SLEEP APNEA): ICD-10-CM

## 2022-01-03 LAB
BASE EXCESS BLDV CALC-SCNC: 2.2 MMOL/L (ref -7.7–1.9)
HCO3 BLDV-SCNC: 28 MMOL/L (ref 21–28)
O2/TOTAL GAS SETTING VFR VENT: 21 %
OXYHGB MFR BLDV: 85 % (ref 70–75)
PCO2 BLDV: 48 MM HG (ref 40–50)
PH BLDV: 7.38 [PH] (ref 7.32–7.43)
PO2 BLDV: 52 MM HG (ref 25–47)

## 2022-01-03 PROCEDURE — 36415 COLL VENOUS BLD VENIPUNCTURE: CPT

## 2022-01-03 PROCEDURE — 82805 BLOOD GASES W/O2 SATURATION: CPT

## 2022-01-03 PROCEDURE — 95811 POLYSOM 6/>YRS CPAP 4/> PARM: CPT | Mod: 26 | Performed by: FAMILY MEDICINE

## 2022-01-03 PROCEDURE — 95811 POLYSOM 6/>YRS CPAP 4/> PARM: CPT

## 2022-01-04 NOTE — PROGRESS NOTES
Patient is here with a complaint of snoring and possible apnea. She went to sleep and did have respiratory events with an index of 21.1 and low SPO2 of 79.7. there was no REM prior to treatment. Patent was placed on CPAP and moved to a final pressure of 14 cmH2O. This kept the patient SPO2 normal and she did have REM on her back. Patient tolerated test and treatment well.

## 2022-01-04 NOTE — PROCEDURES
" SLEEP STUDY INTERPRETATION  SPLIT NIGHT STUDY      Patient: NIRMALA GRACIA  YOB: 1985  Study Date: 1/3/2022  MRN: [id number]  Referring Provider: MARIUM LEE MD  Ordering Provider: SNOW MATTHEWS MD    Indications for Polysomnography: The patient is a 36 year old Female who is 5' 5\" and weighs 304.0 lbs. Her BMI is 51.3, [place] sleepiness scale 6 and neck circumference is 18.25 cm. Relevant medical history includes obesity, anxiety, depression. A diagnostic polysomnogram was performed to evaluate for sleep apnea, hypoventilation, hypoxemia. After 136.5 minutes of sleep time the patient exhibited sufficient respiratory events qualifying her for a CPAP trial which was then initiated.    Polysomnogram Data: A full night polysomnogram recorded the standard physiologic parameters including EEG, EOG, EMG, ECG, nasal and oral airflow. Respiratory parameters of chest and abdominal movements were recorded with respiratory inductance plethysmography. Oxygen saturation was recorded by pulse oximetry.  Hypopnea scoring rule used: 1B 4%    Diagnostic PSG  Sleep Architecture: Increased arousal index.  No REM observed.  The total recording time of the polysomnogram was 158.9 minutes. The total sleep time was 136.5 minutes. Sleep latency was normal at 13.9 minutes without the use of a sleep aid. REM latency was - minutes. Arousal index was increased at 41.8 arousals per hour. Sleep efficiency was normal at 85.9%. Wake after sleep onset was 4.0 minutes. The patient spent 10.6% of total sleep time in Stage N1, 84.2% in Stage N2, 5.1% in Stage N3, and 0.0% in REM. Time in REM supine was - minutes.    Respiration: Moderate KATE (AHI 25.5) with sleep-associated hypoxemia.  Potential that severity under-reported given no REM sleep observed on diagnostic portion.  End-tidal capnography not suggestive of significant hypoventilation and pre-study VBG was WNL (pH 7.38, pCO2 48, HCO3 28)    Events ? The " polysomnogram revealed a presence of 40 obstructive, - central, and - mixed apneas resulting in an apnea index of 17.6 events per hour. There were 1 obstructive hypopneas and - central hypopneas resulting in an obstructive hypopnea index of 0.4 and central hypopnea index of - events per hour. The combined apnea/hypopnea index was 25.5 events per hour (central apnea/hypopnea index was - events per hour).  The REM AHI was - events per hour. The supine AHI was 25.5 events per hour. The RERA index was - events per hour. The RDI was 25.5 events per hour.    Snoring - was reported as loud, primarily when supine.    Respiratory rate and pattern - was notable for normal respiratory rate and pattern.    Sustained Sleep Associated Hypoventilation - End tidal carbon dioxide monitoring was performed until start of CPAP, however significant hypoventilation was not present with 0 minutes at or greater than 50 mmHg.  Pre-study VBG WNL.    Sleep Associated Hypoxemia - (Greater than 5 minutes O2 sat at or below 88%) was present. Baseline oxygen saturation was 92.5%. Lowest oxygen saturation was 79.7%. Time spent less than or equal to 88% was 8.0 minutes. Time spent less than or equal to 89% was 14.0 minutes.     Treatment PSG  Sleep Architecture: Normalized arousal index, significant REM rebound, supine REM was observed.  At 12:09:15 AM the patient was placed on PAP treatment and was titrated at pressures ranging from CPAP 4.5 cmH2O up to CPAP 14 cmH2O. The total recording time of the treatment portion of the study was 359.1 minutes. The total sleep time was 337.0 minutes. During the treatment portion of the study the sleep latency was 5.5 minutes. REM latency was 153.0 minutes. Arousal index was normal at 4.6 arousals per hour. Sleep efficiency was improved at 93.9%. Wake after sleep onset was 17.0 minutes. The patient spent 1.8% of total sleep time in Stage N1, 57.0% in Stage N2, 18.4% in Stage N3, and 22.8% in REM. Time in REM  supine was 77.0 minutes.         Respiration: CPAP at 12 and 14 cm H2O appeared effective for AHI < 5 in supine REM, but 14 cm H2O appeared superior with resolution of infrequent residual hypoxemia events seen on 12 cm H2O.    The final pressure was CPAP 14 cmH2O with an AHI of - events per hour. Time in REM supine on final pressure was 8.5 minutes.   - This titration was considered Optimal (residual AHI < 5 events per hour and including REM?supine sleep at final pressure).     Movement Activity: Nothing of note    Periodic Limb Movements  o During the diagnostic portion of the study, there were - PLMs recorded. The PLM index was - movements per hour. The PLM Arousal Index was - per hour.  o During the treatment portion of the study, there were - PLMs recorded. The PLM index was - movements per hour. The PLM Arousal Index was - per hour.    REM EMG Activity - Excessive transient/sustained muscle activity was not present.    Nocturnal Behavior - Abnormal sleep related behaviors were not noted during/arising out of NREM / REM sleep.     Bruxism - None apparent.    Cardiac Summary: Appears NSR  During the diagnostic portion of the study, the average pulse rate was 74.8 bpm. The minimum pulse rate was 59.8 bpm while the maximum pulse rate was 103.1 bpm.    During the treatment portion of the study, the average pulse rate was 69.2 bpm. The minimum pulse rate was 47.9 bpm while the maximum pulse rate was 105.1 bpm.       Assessment:     Moderate KATE (AHI 25.5) with sleep-associated hypoxemia.  Potential that severity under-reported given no REM sleep observed on diagnostic portion.  End-tidal capnography not suggestive of significant hypoventilation and pre-study VBG was WNL (pH 7.38, pCO2 48, HCO3 28).    On CPAP, seen to have normalized arousal index, significant REM rebound, supine REM was observed.    CPAP at 12 and 14 cm H2O appeared effective for AHI < 5 in supine REM, but 14 cm H2O appeared superior with resolution  of infrequent residual hypoxemia events seen on 12 cm H2O.    Recommendations:    Treatment of KATE with CPAP at 14 cmH2O. Recommend clinical follow up with sleep management team, for coaching and review of effectiveness and compliance measures.    Advice regarding the risks of drowsy driving.    Suggest optimizing sleep schedule and avoiding sleep deprivation.    Weight management (if BMI > 30).    Diagnostic Codes:   Obstructive Sleep Apnea G47.33  Sleep Hypoxemia/Hypoventilation G47.36       1/3/2022 [facility] Sleep Study (304.0 lbs) - AHI 25.5, RDI 25.5, Supine AHI 25.5, REM AHI -, Low O2% 79.7%, Time Spent ?88% 8.0, Time Spent ?89% 14.0. Treatment was titrated to a pressure of CPAP 14 with an AHI -. Time spent in REM supine at this pressure was 8.5 minutes.    _____________________________________   Electronically Signed By: Chris Beasley MD (1/4/2022)

## 2022-01-07 ENCOUNTER — MYC MEDICAL ADVICE (OUTPATIENT)
Dept: FAMILY MEDICINE | Facility: OTHER | Age: 37
End: 2022-01-07
Payer: COMMERCIAL

## 2022-01-07 DIAGNOSIS — F41.9 ANXIETY AND DEPRESSION: ICD-10-CM

## 2022-01-07 DIAGNOSIS — F32.A ANXIETY AND DEPRESSION: ICD-10-CM

## 2022-01-09 ASSESSMENT — SLEEP AND FATIGUE QUESTIONNAIRES
HOW LIKELY ARE YOU TO NOD OFF OR FALL ASLEEP WHILE SITTING QUIETLY AFTER LUNCH WITHOUT ALCOHOL: SLIGHT CHANCE OF DOZING
HOW LIKELY ARE YOU TO NOD OFF OR FALL ASLEEP WHEN YOU ARE A PASSENGER IN A CAR FOR AN HOUR WITHOUT A BREAK: MODERATE CHANCE OF DOZING
HOW LIKELY ARE YOU TO NOD OFF OR FALL ASLEEP WHILE SITTING INACTIVE IN A PUBLIC PLACE: SLIGHT CHANCE OF DOZING
HOW LIKELY ARE YOU TO NOD OFF OR FALL ASLEEP IN A CAR, WHILE STOPPED FOR A FEW MINUTES IN TRAFFIC: WOULD NEVER DOZE
HOW LIKELY ARE YOU TO NOD OFF OR FALL ASLEEP WHILE SITTING AND READING: SLIGHT CHANCE OF DOZING
HOW LIKELY ARE YOU TO NOD OFF OR FALL ASLEEP WHILE LYING DOWN TO REST IN THE AFTERNOON WHEN CIRCUMSTANCES PERMIT: MODERATE CHANCE OF DOZING
HOW LIKELY ARE YOU TO NOD OFF OR FALL ASLEEP WHILE SITTING AND TALKING TO SOMEONE: WOULD NEVER DOZE
HOW LIKELY ARE YOU TO NOD OFF OR FALL ASLEEP WHILE WATCHING TV: SLIGHT CHANCE OF DOZING

## 2022-01-11 ENCOUNTER — VIRTUAL VISIT (OUTPATIENT)
Dept: SLEEP MEDICINE | Facility: HOSPITAL | Age: 37
End: 2022-01-11
Attending: FAMILY MEDICINE
Payer: COMMERCIAL

## 2022-01-11 VITALS — WEIGHT: 293 LBS | BODY MASS INDEX: 48.82 KG/M2 | HEIGHT: 65 IN

## 2022-01-11 DIAGNOSIS — G47.33 OSA (OBSTRUCTIVE SLEEP APNEA): Primary | ICD-10-CM

## 2022-01-11 DIAGNOSIS — E66.01 MORBID OBESITY (H): ICD-10-CM

## 2022-01-11 DIAGNOSIS — F32.9 MAJOR DEPRESSIVE DISORDER WITH CURRENT ACTIVE EPISODE, UNSPECIFIED DEPRESSION EPISODE SEVERITY, UNSPECIFIED WHETHER RECURRENT: ICD-10-CM

## 2022-01-11 DIAGNOSIS — F41.9 ANXIETY: ICD-10-CM

## 2022-01-11 PROCEDURE — 99204 OFFICE O/P NEW MOD 45 MIN: CPT | Mod: GT | Performed by: FAMILY MEDICINE

## 2022-01-11 ASSESSMENT — MIFFLIN-ST. JEOR: SCORE: 2057.7

## 2022-01-11 NOTE — PROGRESS NOTES
"  Bean Garcia is a 36 year old female who is being evaluated via a billable video visit.       The patient has been notified of following:      \"This video visit will be conducted via a call between you and your physician/provider. We have found that certain health care needs can be provided without the need for an in-person physical exam.  This service lets us provide the care you need with a video conversation.  If a prescription is necessary we can send it directly to your pharmacy.  If lab work is needed we can place an order for that and you can then stop by our lab to have the test done at a later time.     Video visits are billed at different rates depending on your insurance coverage.  Please reach out to your insurance provider with any questions.     If during the course of the call the physician/provider feels a video visit is not appropriate, you will not be charged for this service.\"     Patient has given verbal consent for Video visit? Yes  How would you like to obtain your AVS? Mail a copy  If you are dropped from the video visit, the video invite should be resent to: Text to cell phone: -  Will anyone else be joining your video visit? No  If patient encounters technical issues they should call [phone]      Video-Visit Details     Type of service:  Video Visit     Video Start Time: 0900  Video End Time: 9:22 AM    Originating Location (pt. Location): Home     Distant Location (provider location):  [facility]      Platform used for Video Visit: Jeeran    Virtual visit for review of sleep testing results.     A/P:  1.)  Moderate KATE with sleep-associated hypoxemia  - Moderate KATE (AHI 25.5) with sleep-associated hypoxemia.  Potential that severity under-reported given no REM sleep observed on diagnostic portion.  End-tidal capnography not suggestive of significant hypoventilation and pre-study VBG was WNL (pH 7.38, pCO2 48, HCO3 28).  - On CPAP, seen to have " normalized arousal index, significant REM rebound, supine REM was observed.  - CPAP at 12 and 14 cm H2O appeared effective for AHI < 5 in supine REM, but 14 cm H2O appeared superior with resolution of infrequent residual hypoxemia events seen on 12 cm H2O.  - Plan to start treatment with CPAP 14 cm H2O.     2.)  Sleep onset and maintenance insomnia    - Likely multi-factorial with components of possible RLS, possible sleep-state misperception, psychophysiological (excessive time in bed), possible delayed sleep phase.   -We will start with treatment of obstructive sleep apnea, will monitor for improvements and if residual insomnia.    3.)  Morbid obesity  - Recommend consideration for bariatrics referral, we discussed the benefits of weight management in regards to obstructive sleep apnea today    SUBJECTIVE:  Bean Garcia is a 36 year old female.    36 year old yo female who is referred for observed apnea, chronic insomnia.    Pertinent PMHx of obesity, anxiety, depression.     Reviewed visit with Dr. Mccord on 11/5/2021.  Concerns for insomnia, observed apnea.  HPI copied below:  Not sleeping well.  Her mind races and she has a hard time falling asleep.  She had tried trazodone, but didn't help much.  Some nights she is up multiple times even if she does fall asleep.  Her mom, sister and brother all have obstructive sleep apnea.  Bean does snore.  Her dad thinks she might be having apneic episodes.  She does have headaches sometimes in the morning on awakening.  Doesn't fall asleep when watching tv or reading.  Doesn't feel drowsy while driving.  Often feels tired during the day.     Today-we reviewed her sleep study results in detail.  She also notes that her father was very recent diagnosed with obstructive sleep apnea.    STOP-BANG score of 4, with unknown neck circumference.  Franklin score of 6.  BMI of Estimated body mass index is 51.05 kg/m  as calculated from the following:    Height as of 11/5/21:  "1.645 m (5' 4.75\").    Weight as of 11/5/21: 138.1 kg (304 lb 6.4 oz).      Per questionnaire: \"My doctor gave me trazodone to help fall asleep but it's not working. My mind races and cannot fall asleep. My roommate have heard me stop breathing while sleeping.\"     Caffeine use:  No for 3+ per day.  No for within 6 hours of bed.     Tobacco use: No     Do you read in bed? YES     Do you eat in bed? NO     Do you watch TV in bed? NO     Do you do work in bed? NO     Do you use a computer or phone in bed? YES     Sleep pattern:  Workdays.  10pm - 8am, total sleep time 2-3 hours.  Weekends.  10pm - 8am, total sleep time 3-4 hours.  Time to fall asleep: ~2-4 hours.  Awakenings: 2-3 times per night, 30-60 minutes to return to sleep, awake for total of 3-4 hours.  Napping.  0 days per week, - hours per nap.     Yes for RLS screen.  No for sleep walking.  No for dream enactment behavior.  Yes for bruxism.     Yes for morning headaches.  Yes for snoring.  Yes for observed apnea.  Yes for FHx of KATE in mother / brother / sister.     SHx:  Single, lives with parents.  Works for [employer].      Insomnia Severity Index    Difficulty falling asleep 3    Difficulty staying asleep 2    Problems waking up too early 2    How SATISFIED/DISSATISFIED are you with your CURRENT sleep pattern? 3    How NOTICEABLE to others do you think your sleep problem is in terms of impairing the quality of your life? 2    How WORRIED/DISTRESSED are you about your current sleep problem? 3    To what extent do you consider your sleep problem to INTERFERE with your daily functioning (e.g. daytime fatigue, mood, ability to function at work/daily chores, concentration, memory, mood, etc.) CURRENTLY? 3    Total Score 18      SLEEP STUDY INTERPRETATION  SPLIT NIGHT STUDY        Patient: NIRMALA GRACIA  YOB: 1985  Study Date: 1/3/2022  MRN: [id number]  Referring Provider: MARIUM LEE MD  Ordering Provider: SNOW MATTHEWS " "MD     Indications for Polysomnography: The patient is a 36 year old Female who is 5' 5\" and weighs 304.0 lbs. Her BMI is 51.3, Anna sleepiness scale 6 and neck circumference is 18.25 cm. Relevant medical history includes obesity, anxiety, depression. A diagnostic polysomnogram was performed to evaluate for sleep apnea, hypoventilation, hypoxemia. After 136.5 minutes of sleep time the patient exhibited sufficient respiratory events qualifying her for a CPAP trial which was then initiated.     Polysomnogram Data: A full night polysomnogram recorded the standard physiologic parameters including EEG, EOG, EMG, ECG, nasal and oral airflow. Respiratory parameters of chest and abdominal movements were recorded with respiratory inductance plethysmography. Oxygen saturation was recorded by pulse oximetry.  Hypopnea scoring rule used: 1B 4%     Diagnostic PSG  Sleep Architecture: Increased arousal index.  No REM observed.  The total recording time of the polysomnogram was 158.9 minutes. The total sleep time was 136.5 minutes. Sleep latency was normal at 13.9 minutes without the use of a sleep aid. REM latency was - minutes. Arousal index was increased at 41.8 arousals per hour. Sleep efficiency was normal at 85.9%. Wake after sleep onset was 4.0 minutes. The patient spent 10.6% of total sleep time in Stage N1, 84.2% in Stage N2, 5.1% in Stage N3, and 0.0% in REM. Time in REM supine was - minutes.     Respiration: Moderate KATE (AHI 25.5) with sleep-associated hypoxemia.  Potential that severity under-reported given no REM sleep observed on diagnostic portion.  End-tidal capnography not suggestive of significant hypoventilation and pre-study VBG was WNL (pH 7.38, pCO2 48, HCO3 28)    Events ? The polysomnogram revealed a presence of 40 obstructive, - central, and - mixed apneas resulting in an apnea index of 17.6 events per hour. There were 1 obstructive hypopneas and - central hypopneas resulting in an obstructive hypopnea " index of 0.4 and central hypopnea index of - events per hour. The combined apnea/hypopnea index was 25.5 events per hour (central apnea/hypopnea index was - events per hour).  The REM AHI was - events per hour. The supine AHI was 25.5 events per hour. The RERA index was - events per hour. The RDI was 25.5 events per hour.    Snoring - was reported as loud, primarily when supine.    Respiratory rate and pattern - was notable for normal respiratory rate and pattern.    Sustained Sleep Associated Hypoventilation - End tidal carbon dioxide monitoring was performed until start of CPAP, however significant hypoventilation was not present with 0 minutes at or greater than 50 mmHg.  Pre-study VBG WNL.    Sleep Associated Hypoxemia - (Greater than 5 minutes O2 sat at or below 88%) was present. Baseline oxygen saturation was 92.5%. Lowest oxygen saturation was 79.7%. Time spent less than or equal to 88% was 8.0 minutes. Time spent less than or equal to 89% was 14.0 minutes.      Treatment PSG  Sleep Architecture: Normalized arousal index, significant REM rebound, supine REM was observed.  At 12:09:15 AM the patient was placed on PAP treatment and was titrated at pressures ranging from CPAP 4.5 cmH2O up to CPAP 14 cmH2O. The total recording time of the treatment portion of the study was 359.1 minutes. The total sleep time was 337.0 minutes. During the treatment portion of the study the sleep latency was 5.5 minutes. REM latency was 153.0 minutes. Arousal index was normal at 4.6 arousals per hour. Sleep efficiency was improved at 93.9%. Wake after sleep onset was 17.0 minutes. The patient spent 1.8% of total sleep time in Stage N1, 57.0% in Stage N2, 18.4% in Stage N3, and 22.8% in REM. Time in REM supine was 77.0 minutes.            Respiration: CPAP at 12 and 14 cm H2O appeared effective for AHI < 5 in supine REM, but 14 cm H2O appeared superior with resolution of infrequent residual hypoxemia events seen on 12 cm H2O.    The  final pressure was CPAP 14 cmH2O with an AHI of - events per hour. Time in REM supine on final pressure was 8.5 minutes.     This titration was considered Optimal (residual AHI < 5 events per hour and including REM?supine sleep at final pressure).      Movement Activity: Nothing of note    Periodic Limb Movements  ? During the diagnostic portion of the study, there were - PLMs recorded. The PLM index was - movements per hour. The PLM Arousal Index was - per hour.  ? During the treatment portion of the study, there were - PLMs recorded. The PLM index was - movements per hour. The PLM Arousal Index was - per hour.    REM EMG Activity - Excessive transient/sustained muscle activity was not present.    Nocturnal Behavior - Abnormal sleep related behaviors were not noted during/arising out of NREM / REM sleep.     Bruxism - None apparent.     Cardiac Summary: Appears NSR  During the diagnostic portion of the study, the average pulse rate was 74.8 bpm. The minimum pulse rate was 59.8 bpm while the maximum pulse rate was 103.1 bpm.     During the treatment portion of the study, the average pulse rate was 69.2 bpm. The minimum pulse rate was 47.9 bpm while the maximum pulse rate was 105.1 bpm.         Assessment:     Moderate KATE (AHI 25.5) with sleep-associated hypoxemia.  Potential that severity under-reported given no REM sleep observed on diagnostic portion.  End-tidal capnography not suggestive of significant hypoventilation and pre-study VBG was WNL (pH 7.38, pCO2 48, HCO3 28).    On CPAP, seen to have normalized arousal index, significant REM rebound, supine REM was observed.    CPAP at 12 and 14 cm H2O appeared effective for AHI < 5 in supine REM, but 14 cm H2O appeared superior with resolution of infrequent residual hypoxemia events seen on 12 cm H2O.     Recommendations:    Treatment of KATE with CPAP at 14 cmH2O. Recommend clinical follow up with sleep management team, for coaching and review of effectiveness and  compliance measures.    Advice regarding the risks of drowsy driving.    Suggest optimizing sleep schedule and avoiding sleep deprivation.    Weight management (if BMI > 30).     Diagnostic Codes:   Obstructive Sleep Apnea G47.33  Sleep Hypoxemia/Hypoventilation G47.36        1/3/2022 [facility] Sleep Study (304.0 lbs) - AHI 25.5, RDI 25.5, Supine AHI 25.5, REM AHI -, Low O2% 79.7%, Time Spent ?88% 8.0, Time Spent ?89% 14.0. Treatment was titrated to a pressure of CPAP 14 with an AHI -. Time spent in REM supine at this pressure was 8.5 minutes.     _____________________________________   Electronically Signed By: Chris Beasley MD (1/4/2022)         Past medical history:    Patient Active Problem List    Diagnosis Date Noted     Left ovarian cyst 02/18/2021     Priority: Medium     Formatting of this note might be different from the original.  Added automatically from request for surgery [id number]       Morbid obesity (H) 11/03/2020     Priority: Medium       10 point ROS of systems including Constitutional, Eyes, Respiratory, Cardiovascular, Gastroenterology, Genitourinary, Integumentary, Muscularskeletal, Psychiatric were all negative except for pertinent positives noted in my HPI.    Current Outpatient Medications   Medication Sig Dispense Refill     albuterol (PROAIR HFA/PROVENTIL HFA/VENTOLIN HFA) 108 (90 Base) MCG/ACT inhaler Inhale 2 puffs into the lungs every 6 hours as needed for shortness of breath / dyspnea or wheezing 18 g 11     fluticasone (FLOVENT HFA) 110 MCG/ACT inhaler Inhale 1 puff into the lungs 2 times daily 12 g 11     sertraline (ZOLOFT) 50 MG tablet Take 1 tablet (50 mg) by mouth daily 90 tablet 3     traZODone (DESYREL) 50 MG tablet Take 1 tablet (50 mg) by mouth At Bedtime 90 tablet 3       OBJECTIVE:  There were no vitals taken for this visit.    Physical Exam     ---  This note was written with the assistance of the Dragon voice-dictation technology software. The final document,  although reviewed, may contain errors. For corrections, please contact the office.    Chris Beasley MD    Sleep Medicine  [facility]  ([phone])  [facility]  ([phone])    Time spent on the date of service:  35 minutes.

## 2022-01-11 NOTE — PATIENT INSTRUCTIONS

## 2022-01-18 ENCOUNTER — DOCUMENTATION ONLY (OUTPATIENT)
Dept: HOME HEALTH SERVICES | Facility: CLINIC | Age: 37
End: 2022-01-18
Payer: COMMERCIAL

## 2022-01-18 DIAGNOSIS — G47.33 OSA (OBSTRUCTIVE SLEEP APNEA): Primary | ICD-10-CM

## 2022-01-18 NOTE — PROGRESS NOTES
Patient was offered choice of vendor and chose [facility].  Patient Bean Garcia was set up at [facility] on January 18, 2022. Patient received a Resmed Airsense 11 Pressures were set at 14 cm H2O.   Patient s ramp is 7 cm H2O for Auto and FLEX/EPR is EPR, 2.  Patient received a Resmed Mask name: Airfit F30  Full Face mask size Medium, heated tubing and heated humidifier.  Patient does not need to meet compliance. Patient has a follow up on 03/01/2022 with Dr. Beasley.    Ector Dominguez

## 2022-01-20 ENCOUNTER — MYC MEDICAL ADVICE (OUTPATIENT)
Dept: SLEEP MEDICINE | Facility: HOSPITAL | Age: 37
End: 2022-01-20
Payer: COMMERCIAL

## 2022-01-20 DIAGNOSIS — G47.33 OSA (OBSTRUCTIVE SLEEP APNEA): Primary | ICD-10-CM

## 2022-03-05 ENCOUNTER — NURSE TRIAGE (OUTPATIENT)
Dept: NURSING | Facility: CLINIC | Age: 37
End: 2022-03-05
Payer: COMMERCIAL

## 2022-03-05 NOTE — TELEPHONE ENCOUNTER
"Mother calling. She woke up and inside of eye lid, there is blood in the eye ball. There is quite a bit. Left eye. Pain at one. They will seek out care in the next three days.    Nurse Triage SBAR    Is this a 2nd Level Triage? NO    Situation Woke this morning with bleeding on the white part of the eye. Pain at one.    Background Denies injury. Pain at one. Bleeding in white lower part of left eye.     Assessment bleeding in white part of left eye. Pain at one.  Recommendation: Be seen within three days.    (See information below for more triage details.)    Love Darnell RN  [facility]      Reason for Disposition    Bleeding on white of the eye    Additional Information    Negative: Chemical got in the eye    Negative: Piece of something got in the eye    Negative: Eye redness followed an eye injury    Negative: Yellow or green pus in the eyes    Negative: [1] Eyelid is swollen AND [2] no redness of white of eye (sclera)    Negative: Caused by pollen or other allergy OR the main symptom is itchy eyes    Negative: Red, widespread rash also present    Negative: Severe eye pain     A little    Negative: [1] Eyelids are very swollen (shut or almost) AND [2] fever    Negative: [1] Eyelid (outer) is very red (or tender to touch) AND [2] fever    Negative: [1] Foreign body sensation (\"feels like something is in there\") AND [2] irrigation didn't help    Negative: Vomiting    Negative: [1] Recent eye surgery AND [2] increasing eye pain    Negative: Patient sounds very sick or weak to the triager    Negative: Blurred vision    Negative: [1] Eye pain AND [2] more than mild     Pain in left eye at denies now after saying mild, at one.    Negative: Cloudy spot or sore seen on the cornea (clear part of the eye)    Negative: Eyelid is very swollen (shut or almost)    Negative: Eyelid is red and painful (or tender to touch)    Negative: Eye pain present > 24 hours    Negative: [1] Bleeding on white of the eye AND " [2] taking Coumadin (warfarin) or other strong blood thinner, or known bleeding disorder (e.g., thrombocytopenia)    Protocols used: EYE - RED WITHOUT PUS-A-AH

## 2022-03-28 ENCOUNTER — E-VISIT (OUTPATIENT)
Dept: FAMILY MEDICINE | Facility: OTHER | Age: 37
End: 2022-03-28
Payer: COMMERCIAL

## 2022-03-28 DIAGNOSIS — R60.9 EDEMA, UNSPECIFIED TYPE: Primary | ICD-10-CM

## 2022-03-30 NOTE — PROGRESS NOTES
Assessment & Plan     1. Bilateral lower extremity edema  Differential includes gravity dependent edema, increase sodium intake, underlying renal dysfunction, heart failure, etc.  Lab work pending as below.  Will notify with results and treat if indicated.  Symptoms and exam most consistent with gravity dependent edema.  Discussed symptomatic management including compression stockings, decrease sodium diet, monitor hydration, activity as tolerated.  Order for compression stockings as below.  Follow-up if no improvement and can consider low-dose Lasix at that time if needed.  - Basic Metabolic Panel; Future  - Brain Natriuretic Peptide (BNP); Future  - CBC and Differential; Future  - Compression Sleeve/Stocking Order for DME - ONLY FOR DME      Return if symptoms worsen or fail to improve.    Marce Callaway PA-C  [facility]    Subjective     History of Present Illness       Reason for visit:  Swollen feet  Symptom onset:  3-7 days ago  Symptoms include:  Swollen feet, pitting edema, pain in legs  Symptom intensity:  Severe  Symptom progression:  Worsening  Had these symptoms before:  No  What makes it worse:  Sitting walking  What makes it better:  Elevating legs    She eats 0-1 servings of fruits and vegetables daily.She consumes 1 sweetened beverage(s) daily.She exercises with enough effort to increase her heart rate 10 to 19 minutes per day.  She exercises with enough effort to increase her heart rate 3 or less days per week. She is missing 1 dose(s) of medications per week.     Bean is a 36 year old who presents with swelling in feet and ankles. She states she noticed it on Saturday. She denies eating anything different. She states she does eat frozen, canned and processed foods. She sits at a desk for [employer]. She states whenever she checked her blood pressure it has been 120's/80s.  No chest pain or pressure, palpitations, dizziness, lightheadedness, syncope, headache, vision  "changes, shortness of breath.      PAST MEDICAL HISTORY:   Past Medical History:   Diagnosis Date     Mild intermittent asthma without complication        PAST SURGICAL HISTORY: History reviewed. No pertinent surgical history.    FAMILY HISTORY:   Family History   Problem Relation Age of Onset     Arthritis Mother         hip replacement     Asthma Mother      Valvular heart disease Father      Asthma Father      Diabetes Father      Depression Sister      Family History Negative Brother      Valvular heart disease Maternal Grandmother      Kidney Disease Maternal Grandfather         dialysis     Heart Disease Maternal Grandfather         valvular heart disease.     Rheumatoid Arthritis Paternal Grandmother      Heart Failure Paternal Grandfather        SOCIAL HISTORY:   Social History     Tobacco Use     Smoking status: Never Smoker     Smokeless tobacco: Never Used   Substance Use Topics     Alcohol use: No        Allergies   Allergen Reactions     Banana Nausea and Vomiting     Cats      Dogs      Dust Mites      Milk-Related Compounds Nausea and Vomiting and Diarrhea     Amoxicillin Swelling and Rash     Other reaction(s): Edema  joints     Current Outpatient Medications   Medication     albuterol (PROAIR HFA/PROVENTIL HFA/VENTOLIN HFA) 108 (90 Base) MCG/ACT inhaler     fluticasone (FLOVENT HFA) 110 MCG/ACT inhaler     sertraline (ZOLOFT) 50 MG tablet     traZODone (DESYREL) 50 MG tablet     No current facility-administered medications for this visit.         Review of Systems   Per HPI        Objective    /82   Pulse 95   Temp 99.6  F (37.6  C)   Resp 18   Ht 1.645 m (5' 4.75\")   Wt (!) 153.3 kg (338 lb)   SpO2 95%   Breastfeeding No   BMI 56.68 kg/m    Body mass index is 56.68 kg/m .  Physical Exam   General: Pleasant, in no apparent distress.Cardiovascular: Regular rate and rhythm with S1 equal to S2. No murmurs, friction rubs, or gallops.   Respiratory: Lungs are resonant and clear to " auscultation bilaterally. No wheezes, crackles, or rhonchi.  Musculoskeletal: Mild bilateral lower extremity edema, 1+ pitting edema, no overlying skin changes.  No tenderness palpation.  Nonantalgic gait in clinic.  Psych: Appropriate mood and affect.

## 2022-03-31 ENCOUNTER — OFFICE VISIT (OUTPATIENT)
Dept: FAMILY MEDICINE | Facility: OTHER | Age: 37
End: 2022-03-31
Attending: PHYSICIAN ASSISTANT
Payer: COMMERCIAL

## 2022-03-31 VITALS
BODY MASS INDEX: 48.82 KG/M2 | OXYGEN SATURATION: 95 % | HEIGHT: 65 IN | RESPIRATION RATE: 18 BRPM | HEART RATE: 95 BPM | TEMPERATURE: 99.6 F | WEIGHT: 293 LBS | DIASTOLIC BLOOD PRESSURE: 82 MMHG | SYSTOLIC BLOOD PRESSURE: 134 MMHG

## 2022-03-31 DIAGNOSIS — R60.0 BILATERAL LOWER EXTREMITY EDEMA: Primary | ICD-10-CM

## 2022-03-31 LAB
ANION GAP SERPL CALCULATED.3IONS-SCNC: 7 MMOL/L (ref 3–14)
BASOPHILS # BLD AUTO: 0 10E3/UL (ref 0–0.2)
BASOPHILS NFR BLD AUTO: 0 %
BUN SERPL-MCNC: 15 MG/DL (ref 7–25)
CALCIUM SERPL-MCNC: 9.4 MG/DL (ref 8.6–10.3)
CHLORIDE BLD-SCNC: 105 MMOL/L (ref 98–107)
CO2 SERPL-SCNC: 26 MMOL/L (ref 21–31)
CREAT SERPL-MCNC: 0.82 MG/DL (ref 0.6–1.2)
EOSINOPHIL # BLD AUTO: 0.5 10E3/UL (ref 0–0.7)
EOSINOPHIL NFR BLD AUTO: 5 %
ERYTHROCYTE [DISTWIDTH] IN BLOOD BY AUTOMATED COUNT: 14.6 % (ref 10–15)
GFR SERPL CREATININE-BSD FRML MDRD: >90 ML/MIN/1.73M2
GLUCOSE BLD-MCNC: 86 MG/DL (ref 70–105)
HCT VFR BLD AUTO: 41.4 % (ref 35–47)
HGB BLD-MCNC: 13.2 G/DL (ref 11.7–15.7)
IMM GRANULOCYTES # BLD: 0.1 10E3/UL
IMM GRANULOCYTES NFR BLD: 1 %
LYMPHOCYTES # BLD AUTO: 3 10E3/UL (ref 0.8–5.3)
LYMPHOCYTES NFR BLD AUTO: 30 %
MCH RBC QN AUTO: 27.6 PG (ref 26.5–33)
MCHC RBC AUTO-ENTMCNC: 31.9 G/DL (ref 31.5–36.5)
MCV RBC AUTO: 87 FL (ref 78–100)
MONOCYTES # BLD AUTO: 0.7 10E3/UL (ref 0–1.3)
MONOCYTES NFR BLD AUTO: 7 %
NEUTROPHILS # BLD AUTO: 5.8 10E3/UL (ref 1.6–8.3)
NEUTROPHILS NFR BLD AUTO: 57 %
NRBC # BLD AUTO: 0 10E3/UL
NRBC BLD AUTO-RTO: 0 /100
NT-PROBNP SERPL-MCNC: 70 PG/ML (ref 0–100)
PLATELET # BLD AUTO: 232 10E3/UL (ref 150–450)
POTASSIUM BLD-SCNC: 4 MMOL/L (ref 3.5–5.1)
RBC # BLD AUTO: 4.78 10E6/UL (ref 3.8–5.2)
SODIUM SERPL-SCNC: 138 MMOL/L (ref 134–144)
WBC # BLD AUTO: 10.1 10E3/UL (ref 4–11)

## 2022-03-31 PROCEDURE — 99213 OFFICE O/P EST LOW 20 MIN: CPT | Performed by: PHYSICIAN ASSISTANT

## 2022-03-31 PROCEDURE — 85025 COMPLETE CBC W/AUTO DIFF WBC: CPT | Mod: ZL | Performed by: PHYSICIAN ASSISTANT

## 2022-03-31 PROCEDURE — 36415 COLL VENOUS BLD VENIPUNCTURE: CPT | Mod: ZL | Performed by: PHYSICIAN ASSISTANT

## 2022-03-31 PROCEDURE — 82310 ASSAY OF CALCIUM: CPT | Mod: ZL | Performed by: PHYSICIAN ASSISTANT

## 2022-03-31 PROCEDURE — 83880 ASSAY OF NATRIURETIC PEPTIDE: CPT | Mod: ZL | Performed by: PHYSICIAN ASSISTANT

## 2022-03-31 ASSESSMENT — PAIN SCALES - GENERAL: PAINLEVEL: MILD PAIN (2)

## 2022-03-31 NOTE — NURSING NOTE
Patient presents to clinic with bilateral edema in lower legs. Elevating legs does help with swelling.  Michelle Watters LPN ....................  3/31/2022   4:00 PM

## 2022-04-01 ENCOUNTER — TELEPHONE (OUTPATIENT)
Dept: FAMILY MEDICINE | Facility: OTHER | Age: 37
End: 2022-04-01
Payer: COMMERCIAL

## 2022-04-01 ENCOUNTER — MYC MEDICAL ADVICE (OUTPATIENT)
Dept: FAMILY MEDICINE | Facility: OTHER | Age: 37
End: 2022-04-01
Payer: COMMERCIAL

## 2022-04-01 NOTE — TELEPHONE ENCOUNTER
Got script for compression stocking but no measuments    Please contact pharmacy.     Caridad Whittington on 4/1/2022 at 2:37 PM

## 2022-04-01 NOTE — TELEPHONE ENCOUNTER
Notified patient how to do measurements of compression stockings. I did let Jennifer/ALEXA know this.  Michelle Watters, MARKO ....................  4/1/2022   2:57 PM

## 2022-07-25 ENCOUNTER — MYC MEDICAL ADVICE (OUTPATIENT)
Dept: FAMILY MEDICINE | Facility: OTHER | Age: 37
End: 2022-07-25

## 2022-07-26 NOTE — TELEPHONE ENCOUNTER
Called pt in regards to mychart message and testing positive for the covid, message left to return our call and ask for triage  Joy Crain RN on 7/26/2022 at 8:27 AM

## 2022-09-04 ENCOUNTER — HEALTH MAINTENANCE LETTER (OUTPATIENT)
Age: 37
End: 2022-09-04

## 2022-11-21 ENCOUNTER — MYC MEDICAL ADVICE (OUTPATIENT)
Dept: FAMILY MEDICINE | Facility: OTHER | Age: 37
End: 2022-11-21

## 2022-11-21 ASSESSMENT — ASTHMA QUESTIONNAIRES
QUESTION_5 LAST FOUR WEEKS HOW WOULD YOU RATE YOUR ASTHMA CONTROL: WELL CONTROLLED
ACT_TOTALSCORE: 18
QUESTION_4 LAST FOUR WEEKS HOW OFTEN HAVE YOU USED YOUR RESCUE INHALER OR NEBULIZER MEDICATION (SUCH AS ALBUTEROL): TWO OR THREE TIMES PER WEEK
QUESTION_2 LAST FOUR WEEKS HOW OFTEN HAVE YOU HAD SHORTNESS OF BREATH: THREE TO SIX TIMES A WEEK
QUESTION_1 LAST FOUR WEEKS HOW MUCH OF THE TIME DID YOUR ASTHMA KEEP YOU FROM GETTING AS MUCH DONE AT WORK, SCHOOL OR AT HOME: A LITTLE OF THE TIME
ACT_TOTALSCORE: 18
QUESTION_3 LAST FOUR WEEKS HOW OFTEN DID YOUR ASTHMA SYMPTOMS (WHEEZING, COUGHING, SHORTNESS OF BREATH, CHEST TIGHTNESS OR PAIN) WAKE YOU UP AT NIGHT OR EARLIER THAN USUAL IN THE MORNING: ONCE OR TWICE

## 2022-11-30 ENCOUNTER — HOSPITAL ENCOUNTER (EMERGENCY)
Facility: OTHER | Age: 37
Discharge: HOME | End: 2022-11-30
Attending: FAMILY MEDICINE | Admitting: FAMILY MEDICINE
Payer: COMMERCIAL

## 2022-11-30 ENCOUNTER — APPOINTMENT (OUTPATIENT)
Dept: ULTRASOUND IMAGING | Facility: OTHER | Age: 37
End: 2022-11-30
Attending: FAMILY MEDICINE
Payer: COMMERCIAL

## 2022-11-30 VITALS
OXYGEN SATURATION: 98 % | WEIGHT: 293 LBS | BODY MASS INDEX: 50.02 KG/M2 | RESPIRATION RATE: 18 BRPM | HEIGHT: 64 IN | DIASTOLIC BLOOD PRESSURE: 78 MMHG | HEART RATE: 80 BPM | SYSTOLIC BLOOD PRESSURE: 143 MMHG | TEMPERATURE: 97 F

## 2022-11-30 DIAGNOSIS — R10.33 PERIUMBILICAL ABDOMINAL PAIN: ICD-10-CM

## 2022-11-30 LAB
ALBUMIN SERPL BCG-MCNC: 4.2 G/DL (ref 3.5–5.2)
ALBUMIN UR-MCNC: NEGATIVE MG/DL
ALP SERPL-CCNC: 84 U/L (ref 35–104)
ALT SERPL W P-5'-P-CCNC: 33 U/L (ref 10–35)
ANION GAP SERPL CALCULATED.3IONS-SCNC: 11 MMOL/L (ref 7–15)
APPEARANCE UR: CLEAR
AST SERPL W P-5'-P-CCNC: 29 U/L (ref 10–35)
BACTERIA #/AREA URNS HPF: ABNORMAL /HPF
BASOPHILS # BLD AUTO: 0 10E3/UL (ref 0–0.2)
BASOPHILS NFR BLD AUTO: 0 %
BILIRUB SERPL-MCNC: 0.5 MG/DL
BILIRUB UR QL STRIP: NEGATIVE
BUN SERPL-MCNC: 16 MG/DL (ref 6–20)
CALCIUM SERPL-MCNC: 9.5 MG/DL (ref 8.6–10)
CHLORIDE SERPL-SCNC: 100 MMOL/L (ref 98–107)
COLOR UR AUTO: ABNORMAL
CREAT SERPL-MCNC: 0.84 MG/DL (ref 0.51–0.95)
DEPRECATED HCO3 PLAS-SCNC: 27 MMOL/L (ref 22–29)
EOSINOPHIL # BLD AUTO: 0.4 10E3/UL (ref 0–0.7)
EOSINOPHIL NFR BLD AUTO: 4 %
ERYTHROCYTE [DISTWIDTH] IN BLOOD BY AUTOMATED COUNT: 14.5 % (ref 10–15)
GFR SERPL CREATININE-BSD FRML MDRD: >90 ML/MIN/1.73M2
GLUCOSE SERPL-MCNC: 116 MG/DL (ref 70–99)
GLUCOSE UR STRIP-MCNC: NEGATIVE MG/DL
HCG UR QL: NEGATIVE
HCT VFR BLD AUTO: 42.7 % (ref 35–47)
HGB BLD-MCNC: 13.9 G/DL (ref 11.7–15.7)
HGB UR QL STRIP: NEGATIVE
HOLD SPECIMEN: NORMAL
IMM GRANULOCYTES # BLD: 0.1 10E3/UL
IMM GRANULOCYTES NFR BLD: 1 %
KETONES UR STRIP-MCNC: NEGATIVE MG/DL
LEUKOCYTE ESTERASE UR QL STRIP: NEGATIVE
LYMPHOCYTES # BLD AUTO: 2.2 10E3/UL (ref 0.8–5.3)
LYMPHOCYTES NFR BLD AUTO: 20 %
MCH RBC QN AUTO: 27.9 PG (ref 26.5–33)
MCHC RBC AUTO-ENTMCNC: 32.6 G/DL (ref 31.5–36.5)
MCV RBC AUTO: 86 FL (ref 78–100)
MONOCYTES # BLD AUTO: 0.6 10E3/UL (ref 0–1.3)
MONOCYTES NFR BLD AUTO: 5 %
MUCOUS THREADS #/AREA URNS LPF: PRESENT /LPF
NEUTROPHILS # BLD AUTO: 7.8 10E3/UL (ref 1.6–8.3)
NEUTROPHILS NFR BLD AUTO: 70 %
NITRATE UR QL: NEGATIVE
NRBC # BLD AUTO: 0 10E3/UL
NRBC BLD AUTO-RTO: 0 /100
PH UR STRIP: 5.5 [PH] (ref 5–9)
PLATELET # BLD AUTO: 245 10E3/UL (ref 150–450)
POTASSIUM SERPL-SCNC: 4.6 MMOL/L (ref 3.4–5.3)
PROT SERPL-MCNC: 7.8 G/DL (ref 6.4–8.3)
RBC # BLD AUTO: 4.98 10E6/UL (ref 3.8–5.2)
RBC URINE: 1 /HPF
SODIUM SERPL-SCNC: 138 MMOL/L (ref 136–145)
SP GR UR STRIP: 1.02 (ref 1–1.03)
SQUAMOUS EPITHELIAL: 2 /HPF
UROBILINOGEN UR STRIP-MCNC: NORMAL MG/DL
WBC # BLD AUTO: 11.1 10E3/UL (ref 4–11)
WBC URINE: 4 /HPF

## 2022-11-30 PROCEDURE — 81025 URINE PREGNANCY TEST: CPT | Performed by: FAMILY MEDICINE

## 2022-11-30 PROCEDURE — 81003 URINALYSIS AUTO W/O SCOPE: CPT | Performed by: FAMILY MEDICINE

## 2022-11-30 PROCEDURE — 99283 EMERGENCY DEPT VISIT LOW MDM: CPT | Performed by: FAMILY MEDICINE

## 2022-11-30 PROCEDURE — 250N000013 HC RX MED GY IP 250 OP 250 PS 637: Performed by: FAMILY MEDICINE

## 2022-11-30 PROCEDURE — 85025 COMPLETE CBC W/AUTO DIFF WBC: CPT | Performed by: FAMILY MEDICINE

## 2022-11-30 PROCEDURE — 80053 COMPREHEN METABOLIC PANEL: CPT | Performed by: FAMILY MEDICINE

## 2022-11-30 PROCEDURE — 36415 COLL VENOUS BLD VENIPUNCTURE: CPT | Performed by: FAMILY MEDICINE

## 2022-11-30 PROCEDURE — 99284 EMERGENCY DEPT VISIT MOD MDM: CPT | Mod: 25 | Performed by: FAMILY MEDICINE

## 2022-11-30 PROCEDURE — 76830 TRANSVAGINAL US NON-OB: CPT | Mod: TC

## 2022-11-30 RX ORDER — ACETAMINOPHEN 500 MG
1000 TABLET ORAL ONCE
Status: COMPLETED | OUTPATIENT
Start: 2022-11-30 | End: 2022-11-30

## 2022-11-30 RX ADMIN — ACETAMINOPHEN 1000 MG: 500 TABLET ORAL at 20:14

## 2022-11-30 ASSESSMENT — ENCOUNTER SYMPTOMS
DIARRHEA: 0
ABDOMINAL PAIN: 1
VOMITING: 0
CONSTIPATION: 0
NAUSEA: 0
FEVER: 0

## 2022-11-30 ASSESSMENT — ACTIVITIES OF DAILY LIVING (ADL): ADLS_ACUITY_SCORE: 35

## 2022-11-30 NOTE — ED TRIAGE NOTES
"Patient reports a LLQ pain starting 45 min ago that felt like a \"sharp twisting pain\". She has a history of ovarian cysts and it was similar to that. She states at this time her pain is a 5/10. Other than the abdominal pain she has been feeling healthy.      Triage Assessment     Row Name 11/30/22 [id number]       Triage Assessment (Adult)    Airway WDL WDL       Respiratory WDL    Respiratory WDL WDL       Skin Circulation/Temperature WDL    Skin Circulation/Temperature WDL WDL       Cardiac WDL    Cardiac WDL WDL       Peripheral/Neurovascular WDL    Peripheral Neurovascular WDL WDL       Cognitive/Neuro/Behavioral WDL    Cognitive/Neuro/Behavioral WDL WDL              "

## 2022-12-01 NOTE — ED PROVIDER NOTES
"  History     Chief Complaint   Patient presents with     Abdominal Pain     Here with Dad    The history is provided by the patient and a parent.     Nirmala Gracia is a 37 year old female who came to the ED with low abdominal pain. She and her dad were doing puzzles together when she had sudden onset of LLQ abdominal pain rated 8 of 9 of 10. It felt like a twisting pain. She did not take any meds for this. She came to the ED and got up to urinate and then had pain in the RLQ. It was a similar pain. No N/V, no fever, no urine symptoms and no blood in the urine. She has had a BM today which was normal for her. She had something like this before when she had a painful left ovarian cyst. LMP was last week.     Her only abdominal surgery was for the left ovarian cyst.     Allergies:  Allergies   Allergen Reactions     Banana Nausea and Vomiting     Cats      Dogs      Dust Mites      Milk-Related Compounds Nausea and Vomiting and Diarrhea     Amoxicillin Swelling and Rash     Other reaction(s): Edema  joints       Problem List:    Patient Active Problem List    Diagnosis Date Noted     KATE (obstructive sleep apnea) 01/11/2022     Priority: Medium     Moderate KATE with sleep-associated hypoxemia    SLEEP STUDY INTERPRETATION  SPLIT NIGHT STUDY        Patient: NIRMALA GRACIA  YOB: 1985  Study Date: 1/3/2022  MRN: [id number]  Referring Provider: MARIUM LEE MD  Ordering Provider: SNOW MATTHEWS MD     Indications for Polysomnography: The patient is a 36 year old Female who is 5' 5\" and weighs 304.0 lbs. Her BMI is 51.3, [place] sleepiness scale 6 and neck circumference is 18.25 cm. Relevant medical history includes obesity, anxiety, depression. A diagnostic polysomnogram was performed to evaluate for sleep apnea, hypoventilation, hypoxemia. After 136.5 minutes of sleep time the patient exhibited sufficient respiratory events qualifying her for a CPAP trial which was then " initiated.     Polysomnogram Data: A full night polysomnogram recorded the standard physiologic parameters including EEG, EOG, EMG, ECG, nasal and oral airflow. Respiratory parameters of chest and abdominal movements were recorded with respiratory inductance plethysmography. Oxygen saturation was recorded by pulse oximetry.  Hypopnea scoring rule used: 1B 4%     Diagnostic PSG  Sleep Architecture: Increased arousal index.  No REM observed.  The total recording time of the polysomnogram was 158.9 minutes. The total sleep time was 136.5 minutes. Sleep latency was normal at 13.9 minutes without the use of a sleep aid. REM latency was - minutes. Arousal index was increased at 41.8 arousals per hour. Sleep efficiency was normal at 85.9%. Wake after sleep onset was 4.0 minutes. The patient spent 10.6% of total sleep time in Stage N1, 84.2% in Stage N2, 5.1% in Stage N3, and 0.0% in REM. Time in REM supine was - minutes.     Respiration: Moderate KATE (AHI 25.5) with sleep-associated hypoxemia.  Potential that severity under-reported given no REM sleep observed on diagnostic portion.  End-tidal capnography not suggestive of significant hypoventilation and pre-study VBG was WNL (pH 7.38, pCO2 48, HCO3 28)    Events ? The polysomnogram revealed a presence of 40 obstructive, - central, and - mixed apneas resulting in an apnea index of 17.6 events per hour. There were 1 obstructive hypopneas and - central hypopneas resulting in an obstructive hypopnea index of 0.4 and central hypopnea index of - events per hour. The combined apnea/hypopnea index was 25.5 events per hour (central apnea/hypopnea index was - events per hour).  The REM AHI was - events per hour. The supine AHI was 25.5 events per hour. The RERA index was - events per hour. The RDI was 25.5 events per hour.    Snoring - was reported as loud, primarily when supine.    Respiratory rate and pattern - was notable for normal respiratory rate and pattern.    Sustained  Sleep Associated Hypoventilation - End tidal carbon dioxide monitoring was performed until start of CPAP, however significant hypoventilation was not present with 0 minutes at or greater than 50 mmHg.  Pre-study VBG WNL.    Sleep Associated Hypoxemia - (Greater than 5 minutes O2 sat at or below 88%) was present. Baseline oxygen saturation was 92.5%. Lowest oxygen saturation was 79.7%. Time spent less than or equal to 88% was 8.0 minutes. Time spent less than or equal to 89% was 14.0 minutes.      Treatment PSG  Sleep Architecture: Normalized arousal index, significant REM rebound, supine REM was observed.  At 12:09:15 AM the patient was placed on PAP treatment and was titrated at pressures ranging from CPAP 4.5 cmH2O up to CPAP 14 cmH2O. The total recording time of the treatment portion of the study was 359.1 minutes. The total sleep time was 337.0 minutes. During the treatment portion of the study the sleep latency was 5.5 minutes. REM latency was 153.0 minutes. Arousal index was normal at 4.6 arousals per hour. Sleep efficiency was improved at 93.9%. Wake after sleep onset was 17.0 minutes. The patient spent 1.8% of total sleep time in Stage N1, 57.0% in Stage N2, 18.4% in Stage N3, and 22.8% in REM. Time in REM supine was 77.0 minutes.            Respiration: CPAP at 12 and 14 cm H2O appeared effective for AHI < 5 in supine REM, but 14 cm H2O appeared superior with resolution of infrequent residual hypoxemia events seen on 12 cm H2O.    The final pressure was CPAP 14 cmH2O with an AHI of - events per hour. Time in REM supine on final pressure was 8.5 minutes.     This titration was considered Optimal (residual AHI < 5 events per hour and including REM?supine sleep at final pressure).      Movement Activity: Nothing of note    Periodic Limb Movements  ? During the diagnostic portion of the study, there were - PLMs recorded. The PLM index was - movements per hour. The PLM Arousal Index was - per hour.  ? During the  treatment portion of the study, there were - PLMs recorded. The PLM index was - movements per hour. The PLM Arousal Index was - per hour.    REM EMG Activity - Excessive transient/sustained muscle activity was not present.    Nocturnal Behavior - Abnormal sleep related behaviors were not noted during/arising out of NREM / REM sleep.     Bruxism - None apparent.     Cardiac Summary: Appears NSR  During the diagnostic portion of the study, the average pulse rate was 74.8 bpm. The minimum pulse rate was 59.8 bpm while the maximum pulse rate was 103.1 bpm.     During the treatment portion of the study, the average pulse rate was 69.2 bpm. The minimum pulse rate was 47.9 bpm while the maximum pulse rate was 105.1 bpm.         Assessment:     Moderate KATE (AHI 25.5) with sleep-associated hypoxemia.  Potential that severity under-reported given no REM sleep observed on diagnostic portion.  End-tidal capnography not suggestive of significant hypoventilation and pre-study VBG was WNL (pH 7.38, pCO2 48, HCO3 28).    On CPAP, seen to have normalized arousal index, significant REM rebound, supine REM was observed.    CPAP at 12 and 14 cm H2O appeared effective for AHI < 5 in supine REM, but 14 cm H2O appeared superior with resolution of infrequent residual hypoxemia events seen on 12 cm H2O.     Recommendations:    Treatment of KATE with CPAP at 14 cmH2O. Recommend clinical follow up with sleep management team, for coaching and review of effectiveness and compliance measures.    Advice regarding the risks of drowsy driving.    Suggest optimizing sleep schedule and avoiding sleep deprivation.    Weight management (if BMI > 30).     Diagnostic Codes:   Obstructive Sleep Apnea G47.33  Sleep Hypoxemia/Hypoventilation G47.36        1/3/2022 [facility] Sleep Study (304.0 lbs) - AHI 25.5, RDI 25.5, Supine AHI 25.5, REM AHI -, Low O2% 79.7%, Time Spent ?88% 8.0, Time Spent ?89% 14.0. Treatment was titrated to a pressure of CPAP 14  "with an AHI -. Time spent in REM supine at this pressure was 8.5 minutes.     _____________________________________   Electronically Signed By: Chris Beasley MD (1/4/2022)            Left ovarian cyst 02/18/2021     Priority: Medium     Formatting of this note might be different from the original.  Added automatically from request for surgery [id number]       Morbid obesity (H) 11/03/2020     Priority: Medium        Past Medical History:    Past Medical History:   Diagnosis Date     Mild intermittent asthma without complication        Past Surgical History:    No past surgical history on file.    Family History:    Family History   Problem Relation Age of Onset     Arthritis Mother         hip replacement     Asthma Mother      Valvular heart disease Father      Asthma Father      Diabetes Father      Depression Sister      Family History Negative Brother      Valvular heart disease Maternal Grandmother      Kidney Disease Maternal Grandfather         dialysis     Heart Disease Maternal Grandfather         valvular heart disease.     Rheumatoid Arthritis Paternal Grandmother      Heart Failure Paternal Grandfather        Social History:  Marital Status:  Single [1]  Social History     Tobacco Use     Smoking status: Never     Smokeless tobacco: Never   Vaping Use     Vaping Use: Never used   Substance Use Topics     Alcohol use: No     Drug use: No        Medications:    albuterol (PROAIR HFA/PROVENTIL HFA/VENTOLIN HFA) 108 (90 Base) MCG/ACT inhaler  fluticasone (FLOVENT HFA) 110 MCG/ACT inhaler  sertraline (ZOLOFT) 50 MG tablet  traZODone (DESYREL) 50 MG tablet      Review of Systems   Constitutional: Negative for fever.   Gastrointestinal: Positive for abdominal pain. Negative for constipation, diarrhea, nausea and vomiting.   All other systems reviewed and are negative.      Physical Exam   BP: (!) 170/71  Pulse: 83  Temp: 97  F (36.1  C)  Resp: 18  Height: 162.6 cm (5' 4\")  Weight: (!) 153.3 kg (338 lb)  SpO2: " 98 %      Physical Exam  Vitals and nursing note reviewed.   Constitutional:       General: She is not in acute distress.     Appearance: She is well-developed. She is obese. She is not ill-appearing, toxic-appearing or diaphoretic.   Cardiovascular:      Rate and Rhythm: Normal rate and regular rhythm.      Heart sounds: Normal heart sounds.   Pulmonary:      Effort: Pulmonary effort is normal. No respiratory distress.      Breath sounds: Normal breath sounds.   Abdominal:      General: Abdomen is protuberant. Bowel sounds are normal.      Palpations: Abdomen is soft.      Tenderness: There is abdominal tenderness in the right lower quadrant and left lower quadrant.   Skin:     General: Skin is warm and dry.   Neurological:      Mental Status: She is alert.         Results for orders placed or performed during the hospital encounter of 11/30/22 (from the past 24 hour(s))   CBC with platelets differential    Narrative    The following orders were created for panel order CBC with platelets differential.  Procedure                               Abnormality         Status                     ---------                               -----------         ------                     CBC with platelets and d...[[id number]]  Abnormal            Final result                 Please view results for these tests on the individual orders.   Comprehensive metabolic panel   Result Value Ref Range    Sodium 138 136 - 145 mmol/L    Potassium 4.6 3.4 - 5.3 mmol/L    Chloride 100 98 - 107 mmol/L    Carbon Dioxide (CO2) 27 22 - 29 mmol/L    Anion Gap 11 7 - 15 mmol/L    Urea Nitrogen 16.0 6.0 - 20.0 mg/dL    Creatinine 0.84 0.51 - 0.95 mg/dL    Calcium 9.5 8.6 - 10.0 mg/dL    Glucose 116 (H) 70 - 99 mg/dL    Alkaline Phosphatase 84 35 - 104 U/L    AST 29 10 - 35 U/L    ALT 33 10 - 35 U/L    Protein Total 7.8 6.4 - 8.3 g/dL    Albumin 4.2 3.5 - 5.2 g/dL    Bilirubin Total 0.5 <=1.2 mg/dL    GFR Estimate >90 >60 mL/min/1.73m2   CBC with  platelets and differential   Result Value Ref Range    WBC Count 11.1 (H) 4.0 - 11.0 10e3/uL    RBC Count 4.98 3.80 - 5.20 10e6/uL    Hemoglobin 13.9 11.7 - 15.7 g/dL    Hematocrit 42.7 35.0 - 47.0 %    MCV 86 78 - 100 fL    MCH 27.9 26.5 - 33.0 pg    MCHC 32.6 31.5 - 36.5 g/dL    RDW 14.5 10.0 - 15.0 %    Platelet Count 245 150 - 450 10e3/uL    % Neutrophils 70 %    % Lymphocytes 20 %    % Monocytes 5 %    % Eosinophils 4 %    % Basophils 0 %    % Immature Granulocytes 1 %    NRBCs per 100 WBC 0 <1 /100    Absolute Neutrophils 7.8 1.6 - 8.3 10e3/uL    Absolute Lymphocytes 2.2 0.8 - 5.3 10e3/uL    Absolute Monocytes 0.6 0.0 - 1.3 10e3/uL    Absolute Eosinophils 0.4 0.0 - 0.7 10e3/uL    Absolute Basophils 0.0 0.0 - 0.2 10e3/uL    Absolute Immature Granulocytes 0.1 <=0.4 10e3/uL    Absolute NRBCs 0.0 10e3/uL   Extra Tube    Narrative    The following orders were created for panel order Extra Tube.  Procedure                               Abnormality         Status                     ---------                               -----------         ------                     Extra Blue Top Tube[[id number]]                              Final result               Extra Serum Separator Tu...[[id number]]                      Final result               Extra Green Top (Lithium...[[id number]]                      Final result                 Please view results for these tests on the individual orders.   Extra Blue Top Tube   Result Value Ref Range    Hold Specimen JIC    Extra Serum Separator Tube (SST)   Result Value Ref Range    Hold Specimen JIC    Extra Green Top (Lithium Heparin) ON ICE   Result Value Ref Range    Hold Specimen JIC    UA with Microscopic reflex to Culture    Specimen: Urine, Midstream   Result Value Ref Range    Color Urine Light Yellow Colorless, Straw, Light Yellow, Yellow    Appearance Urine Clear Clear    Glucose Urine Negative Negative mg/dL    Bilirubin Urine Negative Negative    Ketones Urine Negative  Negative mg/dL    Specific Gravity Urine 1.024 1.000 - 1.030    Blood Urine Negative Negative    pH Urine 5.5 5.0 - 9.0    Protein Albumin Urine Negative Negative mg/dL    Urobilinogen Urine Normal Normal, 2.0 mg/dL    Nitrite Urine Negative Negative    Leukocyte Esterase Urine Negative Negative    Bacteria Urine Few (A) None Seen /HPF    Mucus Urine Present (A) None Seen /LPF    RBC Urine 1 <=2 /HPF    WBC Urine 4 <=5 /HPF    Squamous Epithelials Urine 2 (H) <=1 /HPF    Narrative    Urine Culture not indicated   HCG qualitative urine (UPT)   Result Value Ref Range    hCG Urine Qualitative Negative Negative   US Transvaginal Non OB    Narrative    PROCEDURE INFORMATION:   Exam: US Pelvis, Transvaginal   Exam date and time: 11/30/2022 8:55 PM   Age: 37 years old   Clinical indication: Pelvic pain; Prior surgery; Surgery date: 6+ months;   Surgery type: Left ovarian cystoscopy     TECHNIQUE:   Imaging protocol: Real-time transvaginal pelvic ultrasound with image   documentation. Transvaginal imaging was used for better evaluation of the   endometrium, adnexa, and/or cervix.     COMPARISON:   No relevant prior studies available.     FINDINGS:   Uterus: The uterus measures 6.8 x 3.2 cm. The uterine myometrium is without   pathologic lesion. No pathologic solid masses or fluid collections within the 9   mm endometrium.   Right ovary/adnexa: The right ovary measures 2.1 x 1.7 cm. Probable round   hypoechoic 10 mm right ovarian follicular cyst. There is positive right ovarian   color Doppler flow demonstrated.   Left ovary/adnexa: The left ovary measures 2.5 x 1.9 cm. The left ovary and   adnexa are without pathologic lesion. There is positive left ovarian color   Doppler flow demonstrated.   Intraperitoneal space: No pathologic free fluid within the pelvis.       Impression    IMPRESSION:   1. Probable small right ovarian cyst as above.   2. Remainder of findings as above.     THIS DOCUMENT HAS BEEN ELECTRONICALLY SIGNED  "BY MILO WITT MD       Medications   acetaminophen (TYLENOL) tablet 1,000 mg (1,000 mg Oral Given 11/30/22 2014)       Assessments & Plan (with Medical Decision Making)  Bean Garcia is a 37 year old female who came to the ED with low abdominal pain. She and her dad were doing puzzles together when she had sudden onset of LLQ abdominal pain rated 8 of 9 of 10. It felt like a twisting pain. She did not take any meds for this. She came to the ED and got up to urinate and then had pain in the RLQ. It was a similar pain. No N/V, no fever, no urine symptoms and no blood in the urine. She has had a BM today which was normal for her. LMP was last week. She had something like this before when she had a painful left ovarian cyst.  Her only abdominal surgery was for the left ovarian cyst.  VS in the ED BP (!) 170/71   Pulse 83   Temp 97  F (36.1  C)   Resp 18   Ht 1.626 m (5' 4\")   Wt (!) 153.3 kg (338 lb)   LMP 11/27/2022   SpO2 98%   BMI 58.02 kg/m    Exam shows minimal low abdominal tenderness with exam. Heart and lungs sound normal. She has normal bowel sounds.  We gave her oral Tylenol.  Labs show CBC with WBC 11,100, CMP normal, UA negative for UTI, UPT negative.  Ultrasound of the pelvis shows probable small right ovarian cyst.  I spoke with her about this. She is feeling better and given her normal labs I do not think a CT is needed.  She and her dad are comfortable with this.       I have reviewed the nursing notes.    I have reviewed the findings, diagnosis, plan and need for follow up with the patient.    Final diagnoses:   Periumbilical abdominal pain       11/30/2022   [facility] AND [facility], Albin Raines MD  11/30/22 [id number]    "

## 2022-12-24 ASSESSMENT — PATIENT HEALTH QUESTIONNAIRE - PHQ9
SUM OF ALL RESPONSES TO PHQ QUESTIONS 1-9: 16
10. IF YOU CHECKED OFF ANY PROBLEMS, HOW DIFFICULT HAVE THESE PROBLEMS MADE IT FOR YOU TO DO YOUR WORK, TAKE CARE OF THINGS AT HOME, OR GET ALONG WITH OTHER PEOPLE: SOMEWHAT DIFFICULT
SUM OF ALL RESPONSES TO PHQ QUESTIONS 1-9: 16

## 2022-12-24 ASSESSMENT — ASTHMA QUESTIONNAIRES
ACT_TOTALSCORE: 17
QUESTION_2 LAST FOUR WEEKS HOW OFTEN HAVE YOU HAD SHORTNESS OF BREATH: ONCE OR TWICE A WEEK
QUESTION_1 LAST FOUR WEEKS HOW MUCH OF THE TIME DID YOUR ASTHMA KEEP YOU FROM GETTING AS MUCH DONE AT WORK, SCHOOL OR AT HOME: SOME OF THE TIME
QUESTION_4 LAST FOUR WEEKS HOW OFTEN HAVE YOU USED YOUR RESCUE INHALER OR NEBULIZER MEDICATION (SUCH AS ALBUTEROL): TWO OR THREE TIMES PER WEEK
QUESTION_3 LAST FOUR WEEKS HOW OFTEN DID YOUR ASTHMA SYMPTOMS (WHEEZING, COUGHING, SHORTNESS OF BREATH, CHEST TIGHTNESS OR PAIN) WAKE YOU UP AT NIGHT OR EARLIER THAN USUAL IN THE MORNING: ONCE OR TWICE
QUESTION_5 LAST FOUR WEEKS HOW WOULD YOU RATE YOUR ASTHMA CONTROL: SOMEWHAT CONTROLLED
ACT_TOTALSCORE: 17

## 2022-12-26 ENCOUNTER — OFFICE VISIT (OUTPATIENT)
Dept: FAMILY MEDICINE | Facility: OTHER | Age: 37
End: 2022-12-26
Attending: FAMILY MEDICINE
Payer: COMMERCIAL

## 2022-12-26 VITALS
OXYGEN SATURATION: 96 % | HEART RATE: 113 BPM | DIASTOLIC BLOOD PRESSURE: 80 MMHG | BODY MASS INDEX: 60.68 KG/M2 | SYSTOLIC BLOOD PRESSURE: 132 MMHG | WEIGHT: 293 LBS | RESPIRATION RATE: 18 BRPM | TEMPERATURE: 98.1 F

## 2022-12-26 DIAGNOSIS — Z11.59 NEED FOR HEPATITIS C SCREENING TEST: ICD-10-CM

## 2022-12-26 DIAGNOSIS — Z23 NEED FOR HEPATITIS B VACCINATION: ICD-10-CM

## 2022-12-26 DIAGNOSIS — Z11.4 SCREENING FOR HIV (HUMAN IMMUNODEFICIENCY VIRUS): ICD-10-CM

## 2022-12-26 DIAGNOSIS — F32.A ANXIETY AND DEPRESSION: ICD-10-CM

## 2022-12-26 DIAGNOSIS — R20.2 RIGHT HAND PARESTHESIA: Primary | ICD-10-CM

## 2022-12-26 DIAGNOSIS — F41.9 ANXIETY AND DEPRESSION: ICD-10-CM

## 2022-12-26 PROCEDURE — 36415 COLL VENOUS BLD VENIPUNCTURE: CPT | Mod: ZL | Performed by: FAMILY MEDICINE

## 2022-12-26 PROCEDURE — 90746 HEPB VACCINE 3 DOSE ADULT IM: CPT | Performed by: FAMILY MEDICINE

## 2022-12-26 PROCEDURE — 86803 HEPATITIS C AB TEST: CPT | Mod: ZL | Performed by: FAMILY MEDICINE

## 2022-12-26 PROCEDURE — 90471 IMMUNIZATION ADMIN: CPT | Performed by: FAMILY MEDICINE

## 2022-12-26 PROCEDURE — 99214 OFFICE O/P EST MOD 30 MIN: CPT | Mod: 25 | Performed by: FAMILY MEDICINE

## 2022-12-26 PROCEDURE — 87389 HIV-1 AG W/HIV-1&-2 AB AG IA: CPT | Mod: ZL | Performed by: FAMILY MEDICINE

## 2022-12-26 RX ORDER — SERTRALINE HYDROCHLORIDE 100 MG/1
100 TABLET, FILM COATED ORAL DAILY
Qty: 90 TABLET | Refills: 3 | Status: SHIPPED | OUTPATIENT
Start: 2022-12-26 | End: 2023-12-01

## 2022-12-26 ASSESSMENT — PATIENT HEALTH QUESTIONNAIRE - PHQ9
SUM OF ALL RESPONSES TO PHQ QUESTIONS 1-9: 16
10. IF YOU CHECKED OFF ANY PROBLEMS, HOW DIFFICULT HAVE THESE PROBLEMS MADE IT FOR YOU TO DO YOUR WORK, TAKE CARE OF THINGS AT HOME, OR GET ALONG WITH OTHER PEOPLE: SOMEWHAT DIFFICULT

## 2022-12-26 ASSESSMENT — PAIN SCALES - GENERAL: PAINLEVEL: NO PAIN (0)

## 2022-12-26 NOTE — PROGRESS NOTES
Nursing Notes:   Soumya Broussard LPN  12/26/2022 11:47 AM  Sign at exiting of workspace  Chief Complaint   Patient presents with     Pain     Thinks she has carpal tunnel of the right hand.     Medication Reconciliation: maco Broussard LPN      Alissa Del Angel is a 37 year old, presenting for the following health issues:  Pain    Over the past 3-4 months, her symptoms of right hand tingling/numbness and pain.  At times she has pain that radiates all the way to her neck.  All of the fingers of her hand are affected.  Worse symptoms in the afternoon.  Uses computer for work.  Even just holding a book bothers her.    She has been struggling more with anxiety and depression lately.  No particular stressors that she can name.  Through her employer's E[employer] program, she has been getting virtual counseling about once a month through the [facility].  She has been taking Zoloft 50 mg recently.      PHQ 11/5/2021 12/2/2021 12/24/2022   PHQ-9 Total Score 8 13 16   Q9: Thoughts of better off dead/self-harm past 2 weeks Not at all Not at all Several days   F/U: Thoughts of suicide or self-harm - - Yes   F/U: Self harm-plan - - No   F/U: Self-harm action - - No   F/U: Safety concerns - - No     ACT Total Scores 11/21/2022 11/26/2022 12/24/2022   ACT TOTAL SCORE (Goal Greater than or Equal to 20) 18 18 17   In the past 12 months, how many times did you visit the emergency room for your asthma without being admitted to the hospital? 0 0 0   In the past 12 months, how many times were you hospitalized overnight because of your asthma? 0 0 0           Pain    History of Present Illness       Reason for visit:  Think I may have carpal tunnel  Symptom onset:  More than a month  Symptoms include:  Numbness in hands and arm, pain, difficulty holding items in hand, pain from finfertipa to neck  Symptom intensity:  Severe  Symptom progression:  Worsening  Had these symptoms before:  Yes  Has tried/received treatment for these  symptoms:  No  What makes it worse:  Using my arm  What makes it better:  Not using my arm    She eats 2-3 servings of fruits and vegetables daily.She consumes 2 sweetened beverage(s) daily.She exercises with enough effort to increase her heart rate 10 to 19 minutes per day.  She exercises with enough effort to increase her heart rate 3 or less days per week. She is missing 1 dose(s) of medications per week.  She is not taking prescribed medications regularly due to remembering to take.    Today's PHQ-9         PHQ-9 Total Score: 16    PHQ-9 Q9 Thoughts of better off dead/self-harm past 2 weeks :   Several days  Thoughts of suicide or self harm: (P) Yes  Self-harm Plan:   (P) No  Self-harm Action:     (P) No  Safety concerns for self or others: (P) No    How difficult have these problems made it for you to do your work, take care of things at home, or get along with other people: Somewhat difficult       Pain       Review of Systems   Constitutional, HEENT, cardiovascular, pulmonary, GI, , musculoskeletal, neuro, skin, endocrine and psych systems are negative, except as otherwise noted.      Objective    /80   Pulse 113   Temp 98.1  F (36.7  C) (Tympanic)   Resp 18   Wt (!) 160.3 kg (353 lb 8 oz)   LMP 12/21/2022   SpO2 96%   Breastfeeding No   BMI 60.68 kg/m    Body mass index is 60.68 kg/m .  Physical Exam  Constitutional:       Appearance: Normal appearance.   HENT:      Head: Normocephalic.   Eyes:      General:         Right eye: Discharge (pt states she scratched her eye yesterday) present.      Extraocular Movements: Extraocular movements intact.      Pupils: Pupils are equal, round, and reactive to light.   Neurological:      Mental Status: She is alert.      Comments: Right upper extremity:  Tinel's negative, Phalen's positive.  Palpation over ulnar groove does not reproduce symptoms.   Psychiatric:         Mood and Affect: Mood normal.         Behavior: Behavior normal.          Assessment  & Plan   Bean Garcia is a 37 year old, presenting for the following health issues:      ICD-10-CM    1. Right hand paresthesia  R20.2 Adult Neurology  Referral     WRIST SPLINT      2. Anxiety and depression  F41.9 sertraline (ZOLOFT) 100 MG tablet    F32.A       3. Need for hepatitis C screening test  Z11.59 Hepatitis C Screen Reflex to HCV RNA Quant and Genotype     Hepatitis C Screen Reflex to HCV RNA Quant and Genotype      4. Screening for HIV (human immunodeficiency virus)  Z11.4 HIV Antigen Antibody Combo     HIV Antigen Antibody Combo      5. Need for hepatitis B vaccination  Z23 GH IMM-  HEPATITIS B VACCINE, ADULT, IM        1.  Referred for EMG.  Wrist splint given to help with symptoms.  If EMG is abnormal, will refer to Orthopedics.  2.  Increase zoloft to 100 mg daily.  Will follow up in approximately 1 month to assess improvement and will also plan to do a physical a that time.  3.  Screening for hepatitis C ordered.  4.  Screening for HIV ordered.  5.  Hepatitis B vaccine #1 given today.      Return in about 1 month (around 1/26/2023) for follow up and also a Physical Exam.    Helen Mccord MD  [facility] AND [facility]

## 2022-12-26 NOTE — LETTER
My Depression Action Plan  Name: Bean Garcia   Date of Birth 1985  Date: 12/26/2022    My doctor: Helen Mccord   My clinic: [facility]  [address]  [place] [id number] [phone]          GREEN    ZONE   Good Control    What it looks like:     Things are going generally well. You have normal ups and downs. You may even feel depressed from time to time, but bad moods usually last less than a day.   What you need to do:  1. Continue to care for yourself (see self care plan)  2. Check your depression survival kit and update it as needed  3. Follow your physician s recommendations including any medication.  4. Do not stop taking medication unless you consult with your physician first.           YELLOW         ZONE Getting Worse    What it looks like:     Depression is starting to interfere with your life.     It may be hard to get out of bed; you may be starting to isolate yourself from others.    Symptoms of depression are starting to last most all day and this has happened for several days.     You may have suicidal thoughts but they are not constant.   What you need to do:     1. Call your care team. Your response to treatment will improve if you keep your care team informed of your progress. Yellow periods are signs an adjustment may need to be made.     2. Continue your self-care.  Just get dressed and ready for the day.  Don't give yourself time to talk yourself out of it.    3. Talk to someone in your support network.    4. Open up your Depression Self-Care Plan/Wellness Kit.           RED    ZONE Medical Alert - Get Help    What it looks like:     Depression is seriously interfering with your life.     You may experience these or other symptoms: You can t get out of bed most days, can t work or engage in other necessary activities, you have trouble taking care of basic hygiene, or basic responsibilities, thoughts of suicide or death that  will not go away, self-injurious behavior.     What you need to do:  1. Call your care team and request a same-day appointment. If they are not available (weekends or after hours) call your local crisis line, emergency room or 911.          Depression Self-Care Plan / Wellness Kit    Many people find that medication and therapy are helpful treatments for managing depression. In addition, making small changes to your everyday life can help to boost your mood and improve your wellbeing. Below are some tips for you to consider. Be sure to talk with your medical provider and/or behavioral health consultant if your symptoms are worsening or not improving.     Sleep   Sleep hygiene  means all of the habits that support good, restful sleep. It includes maintaining a consistent bedtime and wake time, using your bedroom only for sleeping or sex, and keeping the bedroom dark and free of distractions like a computer, smartphone, or television.     Develop a Healthy Routine  Maintain good hygiene. Get out of bed in the morning, make your bed, brush your teeth, take a shower, and get dressed. Don t spend too much time viewing media that makes you feel stressed. Find time to relax each day.    Exercise  Get some form of exercise every day. This will help reduce pain and release endorphins, the  feel good  chemicals in your brain. It can be as simple as just going for a walk or doing some gardening, anything that will get you moving.      Diet  Strive to eat healthy foods, including fruits and vegetables. Drink plenty of water. Avoid excessive sugar, caffeine, alcohol, and other mood-altering substances.     Stay Connected with Others  Stay in touch with friends and family members.    Manage Your Mood  Try deep breathing, massage therapy, biofeedback, or meditation. Take part in fun activities when you can. Try to find something to smile about each day.     Psychotherapy  Be open to working with a therapist if your provider  recommends it.     Medication  Be sure to take your medication as prescribed. Most anti-depressants need to be taken every day. It usually takes several weeks for medications to work. Not all medicines work for all people. It is important to follow-up with your provider to make sure you have a treatment plan that is working for you. Do not stop your medication abruptly without first discussing it with your provider.    Crisis Resources   These hotlines are for both adults and children. They and are open 24 hours a day, 7 days a week unless noted otherwise.      National Suicide Prevention Lifeline   988 or 0-288-462-RNAM (0385)      Crisis Text Line    www.crisistextline.org  Text HOME to 516690 from anywhere in the [place], anytime, about any type of crisis. A live, trained crisis counselor will receive the text and respond quickly.      [employer] Lifeline for LGBTQ Youth  A national crisis intervention and suicide lifeline for LGBTQ youth under 25. Provides a safe place to talk without judgement. Call 1-738.686.4727; text START to 995992 or visit www.thetrevorproject.org to talk to a trained counselor.      For [facility] crisis numbers, visit the [facility] website at:  https://[place].gov/dhs/people-we-serve/adults/health-care/mental-health/resources/crisis-contacts.jsp

## 2022-12-26 NOTE — NURSING NOTE
Chief Complaint   Patient presents with     Pain     Thinks she has carpal tunnel of the right hand.     Medication Reconciliation: complete    Soumya Broussard LPN

## 2022-12-27 LAB
HCV AB SERPL QL IA: NONREACTIVE
HIV 1+2 AB+HIV1 P24 AG SERPL QL IA: NONREACTIVE

## 2023-01-15 ENCOUNTER — HEALTH MAINTENANCE LETTER (OUTPATIENT)
Age: 38
End: 2023-01-15

## 2023-01-17 ENCOUNTER — TELEPHONE (OUTPATIENT)
Dept: FAMILY MEDICINE | Facility: OTHER | Age: 38
End: 2023-01-17

## 2023-01-17 ENCOUNTER — OFFICE VISIT (OUTPATIENT)
Dept: NEUROLOGY | Facility: OTHER | Age: 38
End: 2023-01-17
Attending: PSYCHIATRY & NEUROLOGY
Payer: COMMERCIAL

## 2023-01-17 VITALS
WEIGHT: 293 LBS | RESPIRATION RATE: 20 BRPM | HEIGHT: 64 IN | TEMPERATURE: 97.3 F | BODY MASS INDEX: 50.02 KG/M2 | DIASTOLIC BLOOD PRESSURE: 74 MMHG | OXYGEN SATURATION: 97 % | SYSTOLIC BLOOD PRESSURE: 128 MMHG | HEART RATE: 85 BPM

## 2023-01-17 DIAGNOSIS — G56.01 CARPAL TUNNEL SYNDROME OF RIGHT WRIST: Primary | ICD-10-CM

## 2023-01-17 DIAGNOSIS — M54.2 CERVICALGIA: ICD-10-CM

## 2023-01-17 PROCEDURE — 99204 OFFICE O/P NEW MOD 45 MIN: CPT | Mod: 25 | Performed by: PSYCHIATRY & NEUROLOGY

## 2023-01-17 PROCEDURE — 95886 MUSC TEST DONE W/N TEST COMP: CPT | Performed by: PSYCHIATRY & NEUROLOGY

## 2023-01-17 PROCEDURE — 95911 NRV CNDJ TEST 9-10 STUDIES: CPT | Performed by: PSYCHIATRY & NEUROLOGY

## 2023-01-17 ASSESSMENT — PAIN SCALES - GENERAL: PAINLEVEL: MILD PAIN (2)

## 2023-01-17 NOTE — NURSING NOTE
Patient presents to clinic experiencing right hand up to neck tingling, pain and numbness.    Medication Rec Complete  Flower Roy LPN............1/17/2023 10:08 AM

## 2023-01-17 NOTE — TELEPHONE ENCOUNTER
Called and spoke to Patient after verifying last name and date of birth.    Patient verbalized understanding of information from Dr Manjeet Figueroa.     Magdalena Green RN .............. 1/17/2023  2:36 PM

## 2023-01-17 NOTE — TELEPHONE ENCOUNTER
Please call - her EMG showed she has severe carpal tunnel syndrome of her right wrist.  I would recommend that she be seen by Orthopedics for this. Referral placed.  Helen Mccord MD

## 2023-01-17 NOTE — PROGRESS NOTES
Visit Date: 01/17/2023    NEURODIAGNOSTICS CONSULTATION    REFERRING PHYSICIAN:  Helen Mccord MD    HISTORY OF PRESENT ILLNESS:  The patient is a 37-year-old who relates a several-month history of recurrent pain and paresthesia affecting the right hand and forearm.  She thinks there may be some reduction in  strength.  There has been no obvious injury or precipitating event.  The patient does spend quite a bit of time on a computer terminal. She is right-handed and asymptomatic on the left.  She describes neck pain and sometimes has pain, which radiates from the neck to the hand.    PAST MEDICAL HISTORY:  Primarily significant for chronic morbid obesity.  The patient is not known to be diabetic.    REVIEW OF SYSTEMS:  Ten-system review of systems, other than the above, is unremarkable.    FAMILY HISTORY:  Positive for carpal tunnel syndrome diagnosed for the patient's father at about 40 years of age.    SOCIAL HISTORY:  The patient does not use tobacco.  She works as a  for [employer].    PHYSICAL EXAMINATION:  The patient is 65 inches tall and weighs 351 pounds. Blood pressure is 128/74.  She is afebrile.  Gait is consistent with body habitus.  The strength is reduced on the right for the abductors of the thumb, but there is normal symmetric strength for the finger extensors, interossei, forearm flexors, and forearm extensors.  Reflexes are 1/4 at the triceps bilaterally and 1+ at the biceps and brachioradialis bilaterally.  Pinprick is intact in the cervical dermatomes of the upper extremities.    NERVE CONDUCTION STUDIES:  Motor nerve conduction testing was performed for the right median and ulnar nerves.  There was severe latency prolongation and slowing at the wrist for the median nerve with moderate conduction block.  The ulnar study was normal.  H-reflex responses were unobtainable, but this appeared to be the result of excessive adipose at the recording sites.    Orthodromic mixed  conduction studies were performed for the right median and ulnar nerves.  Antidromic sensory nerve conduction studies were performed for the second digital, fifth digital and radial nerves.  There was moderate latency prolongation and slowing for both the median and second digital nerves.  There was significant amplitude reduction and increased dispersion of the waveform for the second digital nerve.    MONOPOLAR EMG NEEDLE EXAMINATION:  Monopolar EMG needle exam was performed for the right first dorsal interosseous, extensor digitorum communis, flexor carpi radialis, triceps, and brachioradialis.  All of the tested muscles showed normal insertional activity.  Motor units were normal in size with normal recruitment and interference.    IMPRESSION:  The patient has severe right carpal tunnel syndrome, but otherwise looks to be neurologically intact.  The degree of conduction slowing seen for the median nerve is great enough that resolution cannot be expected with nonsurgical treatment.    The patient describes some neck pain and even some radicular quality pain in the right upper extremity, but there is no evidence for radiculopathy or focal neuropathy, apart from the carpal tunnel syndrome.    Findings were reviewed with the patient.    Albin Vu MD        D: 2023   T: 2023   MT: MINNIE    Name:     NIRMALA GRACIA  MRN:      [id number]        Account:    [id number]   :      1985           Visit Date: 2023     Document: [id number]    cc:  Helen Mccord MD

## 2023-01-17 NOTE — LETTER
1/17/2023         RE: Bean Garcia  [address] [address] [place] [id number]        Dear Colleague,    Thank you for referring your patient, Bean Garcia, to the [facility]. Please see a copy of my visit note below.    Visit Date: 01/17/2023    NEURODIAGNOSTICS CONSULTATION    REFERRING PHYSICIAN:  Helen Mccord MD    HISTORY OF PRESENT ILLNESS:  The patient is a 37-year-old who relates a several-month history of recurrent pain and paresthesia affecting the right hand and forearm.  She thinks there may be some reduction in  strength.  There has been no obvious injury or precipitating event.  The patient does spend quite a bit of time on a computer terminal. She is right-handed and asymptomatic on the left.  She describes neck pain and sometimes has pain, which radiates from the neck to the hand.    PAST MEDICAL HISTORY:  Primarily significant for chronic morbid obesity.  The patient is not known to be diabetic.    REVIEW OF SYSTEMS:  Ten-system review of systems, other than the above, is unremarkable.    FAMILY HISTORY:  Positive for carpal tunnel syndrome diagnosed for the patient's father at about 40 years of age.    SOCIAL HISTORY:  The patient does not use tobacco.  She works as a  for [employer].    PHYSICAL EXAMINATION:  The patient is 65 inches tall and weighs 351 pounds. Blood pressure is 128/74.  She is afebrile.  Gait is consistent with body habitus.  The strength is reduced on the right for the abductors of the thumb, but there is normal symmetric strength for the finger extensors, interossei, forearm flexors, and forearm extensors.  Reflexes are 1/4 at the triceps bilaterally and 1+ at the biceps and brachioradialis bilaterally.  Pinprick is intact in the cervical dermatomes of the upper extremities.    NERVE CONDUCTION STUDIES:  Motor nerve conduction testing was performed for the right median and ulnar nerves.  There was severe latency prolongation and  slowing at the wrist for the median nerve with moderate conduction block.  The ulnar study was normal.  H-reflex responses were unobtainable, but this appeared to be the result of excessive adipose at the recording sites.    Orthodromic mixed conduction studies were performed for the right median and ulnar nerves.  Antidromic sensory nerve conduction studies were performed for the second digital, fifth digital and radial nerves.  There was moderate latency prolongation and slowing for both the median and second digital nerves.  There was significant amplitude reduction and increased dispersion of the waveform for the second digital nerve.    MONOPOLAR EMG NEEDLE EXAMINATION:  Monopolar EMG needle exam was performed for the right first dorsal interosseous, extensor digitorum communis, flexor carpi radialis, triceps, and brachioradialis.  All of the tested muscles showed normal insertional activity.  Motor units were normal in size with normal recruitment and interference.    IMPRESSION:  The patient has severe right carpal tunnel syndrome, but otherwise looks to be neurologically intact.  The degree of conduction slowing seen for the median nerve is great enough that resolution cannot be expected with nonsurgical treatment.    The patient describes some neck pain and even some radicular quality pain in the right upper extremity, but there is no evidence for radiculopathy or focal neuropathy, apart from the carpal tunnel syndrome.    Findings were reviewed with the patient.    Albin Vu MD        D: 2023   T: 2023   MT: [facility]    Name:     NIRMALA GRACIA  MRN:      [id number]        Account:    [id number]   :      1985           Visit Date: 2023     Document: [id number]    cc:  Helen Mccord MD       Again, thank you for allowing me to participate in the care of your patient.        Sincerely,        Albin Vu MD

## 2023-01-18 ENCOUNTER — MYC MEDICAL ADVICE (OUTPATIENT)
Dept: FAMILY MEDICINE | Facility: OTHER | Age: 38
End: 2023-01-18
Payer: COMMERCIAL

## 2023-02-24 DIAGNOSIS — G56.01 CARPAL TUNNEL SYNDROME OF RIGHT WRIST: Primary | ICD-10-CM

## 2023-03-03 ENCOUNTER — OFFICE VISIT (OUTPATIENT)
Dept: ORTHOPEDICS | Facility: OTHER | Age: 38
End: 2023-03-03
Attending: SPECIALIST
Payer: COMMERCIAL

## 2023-03-03 ENCOUNTER — HOSPITAL ENCOUNTER (OUTPATIENT)
Dept: GENERAL RADIOLOGY | Facility: OTHER | Age: 38
Discharge: HOME | End: 2023-03-03
Attending: SPECIALIST
Payer: COMMERCIAL

## 2023-03-03 VITALS — BODY MASS INDEX: 60.08 KG/M2 | HEART RATE: 76 BPM | OXYGEN SATURATION: 95 % | WEIGHT: 293 LBS

## 2023-03-03 DIAGNOSIS — G56.01 CARPAL TUNNEL SYNDROME OF RIGHT WRIST: ICD-10-CM

## 2023-03-03 PROCEDURE — 73110 X-RAY EXAM OF WRIST: CPT | Mod: RT

## 2023-03-03 PROCEDURE — 99214 OFFICE O/P EST MOD 30 MIN: CPT | Performed by: SPECIALIST

## 2023-03-03 ASSESSMENT — PAIN SCALES - GENERAL: PAINLEVEL: MILD PAIN (3)

## 2023-03-03 NOTE — PROGRESS NOTES
Patient is here for consult on her right carpal tunnel.  Simona Ocampo LPN .....................3/3/2023 10:02 AM

## 2023-03-03 NOTE — PROGRESS NOTES
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  She is seen today for evaluation of numbness and tingling about the right upper extremity.  She underwent EMG studies by Dr. Vu and was referred for evaluation.  The patient is not a diabetic.    PHYSICAL EXAMINATION:  Examination reveals a 37-year-old female.  She is alert and oriented x3 and appropriate.  Gait and station are appropriate.  She is well groomed, well kempt.  Examination of both upper extremities reveals full and symmetric range of motion of shoulders, elbows.  Examination of both hands and wrists shows no evidence of atrophic skin change, adenopathy, or focal weakness.  She has numbness in the median distribution prior to provocative maneuvers, which compress the median nerve.    IMAGING:  X-rays, AP, lateral, and oblique views of the right wrist as well as carpal tunnel views were reviewed.  These revealed uniform mineralization, well maintained joint spaces, no evidence of fracture and/or dislocation.  EMG studies reviewed performed by Dr. Vu dated 2023.  The EMG shows severe latency prolongation for the median nerve at the right wrist with a moderate conduction block.    IMPRESSION:  Severe right carpal tunnel syndrome.      PLAN:  Plans were discussed with the patient.  We recommend endoscopic carpal tunnel release.  Risks, complications, benefits reviewed, and this can be scheduled at her convenience.    Time with patient, 30 minutes.    Mickey Manzanares MD        D: 2023   T: 2023   MT: DEJON    Name:     BEAN GRACIA  MRN:      [id number]        Account:    [id number]   :      1985           Visit Date: 2023     Document: [id number]

## 2023-03-15 DIAGNOSIS — G47.33 OSA (OBSTRUCTIVE SLEEP APNEA): Primary | ICD-10-CM

## 2023-04-30 ENCOUNTER — MYC MEDICAL ADVICE (OUTPATIENT)
Dept: FAMILY MEDICINE | Facility: OTHER | Age: 38
End: 2023-04-30
Payer: COMMERCIAL

## 2023-05-14 ASSESSMENT — ASTHMA QUESTIONNAIRES
ACT_TOTALSCORE: 18
QUESTION_3 LAST FOUR WEEKS HOW OFTEN DID YOUR ASTHMA SYMPTOMS (WHEEZING, COUGHING, SHORTNESS OF BREATH, CHEST TIGHTNESS OR PAIN) WAKE YOU UP AT NIGHT OR EARLIER THAN USUAL IN THE MORNING: ONCE OR TWICE
QUESTION_4 LAST FOUR WEEKS HOW OFTEN HAVE YOU USED YOUR RESCUE INHALER OR NEBULIZER MEDICATION (SUCH AS ALBUTEROL): ONCE A WEEK OR LESS
ACT_TOTALSCORE: 18
QUESTION_2 LAST FOUR WEEKS HOW OFTEN HAVE YOU HAD SHORTNESS OF BREATH: ONCE OR TWICE A WEEK
QUESTION_1 LAST FOUR WEEKS HOW MUCH OF THE TIME DID YOUR ASTHMA KEEP YOU FROM GETTING AS MUCH DONE AT WORK, SCHOOL OR AT HOME: SOME OF THE TIME
QUESTION_5 LAST FOUR WEEKS HOW WOULD YOU RATE YOUR ASTHMA CONTROL: SOMEWHAT CONTROLLED

## 2023-06-12 ENCOUNTER — OFFICE VISIT (OUTPATIENT)
Dept: FAMILY MEDICINE | Facility: OTHER | Age: 38
End: 2023-06-12
Attending: FAMILY MEDICINE
Payer: COMMERCIAL

## 2023-06-12 VITALS
DIASTOLIC BLOOD PRESSURE: 74 MMHG | HEART RATE: 85 BPM | SYSTOLIC BLOOD PRESSURE: 148 MMHG | RESPIRATION RATE: 22 BRPM | TEMPERATURE: 98.1 F | BODY MASS INDEX: 61.45 KG/M2 | OXYGEN SATURATION: 97 % | WEIGHT: 293 LBS

## 2023-06-12 DIAGNOSIS — R13.19 ESOPHAGEAL DYSPHAGIA: Primary | ICD-10-CM

## 2023-06-12 PROCEDURE — 99213 OFFICE O/P EST LOW 20 MIN: CPT | Performed by: FAMILY MEDICINE

## 2023-06-12 ASSESSMENT — PATIENT HEALTH QUESTIONNAIRE - PHQ9
SUM OF ALL RESPONSES TO PHQ QUESTIONS 1-9: 9
10. IF YOU CHECKED OFF ANY PROBLEMS, HOW DIFFICULT HAVE THESE PROBLEMS MADE IT FOR YOU TO DO YOUR WORK, TAKE CARE OF THINGS AT HOME, OR GET ALONG WITH OTHER PEOPLE: SOMEWHAT DIFFICULT
SUM OF ALL RESPONSES TO PHQ QUESTIONS 1-9: 9

## 2023-06-12 ASSESSMENT — PAIN SCALES - GENERAL: PAINLEVEL: NO PAIN (0)

## 2023-06-12 NOTE — NURSING NOTE
Patient is here for concerns with swallowing. States will choke on water and food, to the point of throwing up.  Has been on going for years, will happen every couple weeks about.     Maribell Shepard LPN .............6/12/2023     3:46 PM

## 2023-06-12 NOTE — PROGRESS NOTES
Nursing Notes:   Maribell Shepard LPN  6/12/2023  3:46 PM  Signed  Patient is here for concerns with swallowing. States will choke on water and food, to the point of throwing up.  Has been on going for years, will happen every couple weeks about.     Maribell Shepard LPN .............6/12/2023     3:46 PM        Alissa Del Angel is a 37 year old, presenting for the following health issues:  Problems Swallowing    She has been having issues with swallowing difficulty for a few years.  Initially would only happen once every few months.  Now is happening about once every 2 weeks, sometimes a couple days in a row.  Feels like she is able to swallow, but gets stuck.  Sometimes she can keep breathing when it happens, but sometimes she feels like she cannot.  Never has needed Heimlich maneuver.  Tries to drink water, but gets stuck and it will not go down.  Has to vomit and comes up.  Has to wait a while before she can eat again.  Later in the day, she can swallow ok.  Feels like things are getting caught in the same spot.  No gastroesophageal reflux disease symptoms.  She has lactose intolerance and avoids dairy, but doesn't have any other food intolerances she is aware of.  No prior EGD.  This has even happened with just drinking water.        6/12/2023     3:43 PM   Additional Questions   Roomed by Maribell hopkins     History of Present Illness       Reason for visit:  Choking  Symptom onset:  More than a month  Symptoms include:  Choking and throwing up  Symptom intensity:  Severe  Symptom progression:  Worsening  Had these symptoms before:  Yes  Has tried/received treatment for these symptoms:  No  What makes it worse:  Eating  What makes it better:  Not eating    She eats 2-3 servings of fruits and vegetables daily.She consumes 2 sweetened beverage(s) daily.She exercises with enough effort to increase her heart rate 10 to 19 minutes per day.  She exercises with enough effort to increase her heart rate 3 or less days per  week. She is missing 1 dose(s) of medications per week.  She is not taking prescribed medications regularly due to remembering to take.    Today's PHQ-9         PHQ-9 Total Score: 9    PHQ-9 Q9 Thoughts of better off dead/self-harm past 2 weeks :   Not at all    How difficult have these problems made it for you to do your work, take care of things at home, or get along with other people: Somewhat difficult               Review of Systems   Constitutional, HEENT, cardiovascular, pulmonary, GI, , musculoskeletal, neuro, skin, endocrine and psych systems are negative, except as otherwise noted.      Objective    BP (!) 148/74   Pulse 85   Temp 98.1  F (36.7  C) (Tympanic)   Resp 22   Wt (!) 162.4 kg (358 lb)   LMP 05/25/2023 (Approximate)   SpO2 97%   Breastfeeding No   BMI 61.45 kg/m    Body mass index is 61.45 kg/m .  Physical Exam  Constitutional:       Appearance: Normal appearance. She is obese.   HENT:      Head: Normocephalic.      Mouth/Throat:      Mouth: Mucous membranes are moist.      Pharynx: Oropharynx is clear. No oropharyngeal exudate or posterior oropharyngeal erythema.   Eyes:      Extraocular Movements: Extraocular movements intact.   Cardiovascular:      Rate and Rhythm: Normal rate and regular rhythm.      Heart sounds: Normal heart sounds. No murmur heard.  Pulmonary:      Effort: Pulmonary effort is normal.      Breath sounds: Normal breath sounds. No wheezing, rhonchi or rales.   Abdominal:      General: Bowel sounds are normal.      Palpations: Abdomen is soft.   Musculoskeletal:      Cervical back: Normal range of motion and neck supple.   Lymphadenopathy:      Cervical: No cervical adenopathy.   Neurological:      Mental Status: She is alert.   Psychiatric:         Mood and Affect: Mood normal.         Behavior: Behavior normal.        Assessment & Plan     ICD-10-CM    1. Esophageal dysphagia  R13.19 Adult GI  Referral - Procedure Only        1.  She is referred for an EGD  to evaluate this further.  Discussed that she may have a stricture that needs to be dilated.  Discussed other possible etiologies that could cause symptoms such as hers as well.            Encouraged regular exercise.     No follow-ups on file.    Helen Mccord MD  [facility]

## 2023-06-13 ENCOUNTER — TELEPHONE (OUTPATIENT)
Dept: SURGERY | Facility: OTHER | Age: 38
End: 2023-06-13
Payer: COMMERCIAL

## 2023-06-13 NOTE — TELEPHONE ENCOUNTER
[facility] Diagnostic Referral    Patient has a referral for an EGD with a diagnosis of Esophageal dysphagia.    Please advise.    Thank you,    Dalia Gary on 6/13/2023 at 1:28 PM

## 2023-06-23 ENCOUNTER — TELEPHONE (OUTPATIENT)
Dept: SURGERY | Facility: OTHER | Age: 38
End: 2023-06-23
Payer: COMMERCIAL

## 2023-06-23 NOTE — TELEPHONE ENCOUNTER
Screening Questions for the Scheduling of Screening Colonoscopies   (If Colonoscopy is diagnostic, Provider should review the chart before scheduling.)  Are you younger than 50 or older than 80?  YES  Do you take aspirin or fish oil?  NO (if yes, tell patient to stop 1 week prior to Colonoscopy)  Do you take warfarin (Coumadin), clopidogrel (Plavix), apixaban (Eliquis), dabigatram (Pradaxa), rivaroxaban (Xarelto) or any blood thinner? NO  Do you use oxygen at home?  CPAP  Do you have kidney disease? NO  Are you on dialysis? NO  Have you had a stroke or heart attack in the last year? NO  Have you had a stent in your heart or any blood vessel in the last year? NO  Have you had a transplant of any organ? NO  Have you had a colonoscopy or upper endoscopy (EGD) before? NO  Date of scheduled EGD. 09/05/2023  Provider [facility]  Pharmacy [facility]

## 2023-06-23 NOTE — TELEPHONE ENCOUNTER
Disregard note.  Patient BMI is over what our facility can handle.  Referral is being sent to another facility.

## 2023-08-16 ENCOUNTER — MYC MEDICAL ADVICE (OUTPATIENT)
Dept: FAMILY MEDICINE | Facility: OTHER | Age: 38
End: 2023-08-16
Payer: COMMERCIAL

## 2023-08-16 ASSESSMENT — ASTHMA QUESTIONNAIRES: ACT_TOTALSCORE: 14

## 2023-10-21 ASSESSMENT — ASTHMA QUESTIONNAIRES: ACT_TOTALSCORE: 15

## 2023-10-24 ENCOUNTER — OFFICE VISIT (OUTPATIENT)
Dept: FAMILY MEDICINE | Facility: OTHER | Age: 38
End: 2023-10-24
Attending: PHYSICIAN ASSISTANT
Payer: COMMERCIAL

## 2023-10-24 VITALS
BODY MASS INDEX: 48.82 KG/M2 | HEART RATE: 91 BPM | RESPIRATION RATE: 20 BRPM | OXYGEN SATURATION: 97 % | WEIGHT: 293 LBS | SYSTOLIC BLOOD PRESSURE: 148 MMHG | DIASTOLIC BLOOD PRESSURE: 92 MMHG | TEMPERATURE: 96.9 F | HEIGHT: 65 IN

## 2023-10-24 DIAGNOSIS — L30.9 DERMATITIS: Primary | ICD-10-CM

## 2023-10-24 DIAGNOSIS — L73.9 FOLLICULITIS: ICD-10-CM

## 2023-10-24 PROCEDURE — 99213 OFFICE O/P EST LOW 20 MIN: CPT | Performed by: PHYSICIAN ASSISTANT

## 2023-10-24 RX ORDER — HYDROXYZINE HYDROCHLORIDE 25 MG/1
25 TABLET, FILM COATED ORAL 3 TIMES DAILY PRN
Qty: 30 TABLET | Refills: 0 | Status: SHIPPED | OUTPATIENT
Start: 2023-10-24 | End: 2023-12-01

## 2023-10-24 RX ORDER — DOXYCYCLINE 100 MG/1
100 CAPSULE ORAL 2 TIMES DAILY
Qty: 14 CAPSULE | Refills: 0 | Status: SHIPPED | OUTPATIENT
Start: 2023-10-24 | End: 2023-10-31

## 2023-10-24 RX ORDER — PREDNISONE 20 MG/1
TABLET ORAL
Qty: 11 TABLET | Refills: 0 | Status: SHIPPED | OUTPATIENT
Start: 2023-10-24 | End: 2023-11-02

## 2023-10-24 ASSESSMENT — PATIENT HEALTH QUESTIONNAIRE - PHQ9
10. IF YOU CHECKED OFF ANY PROBLEMS, HOW DIFFICULT HAVE THESE PROBLEMS MADE IT FOR YOU TO DO YOUR WORK, TAKE CARE OF THINGS AT HOME, OR GET ALONG WITH OTHER PEOPLE: SOMEWHAT DIFFICULT
SUM OF ALL RESPONSES TO PHQ QUESTIONS 1-9: 8
SUM OF ALL RESPONSES TO PHQ QUESTIONS 1-9: 8

## 2023-10-24 ASSESSMENT — PAIN SCALES - GENERAL: PAINLEVEL: SEVERE PAIN (6)

## 2023-10-24 NOTE — PROGRESS NOTES
"  Assessment & Plan       ICD-10-CM    1. Dermatitis  L30.9 hydrOXYzine (ATARAX) 25 MG tablet     predniSONE (DELTASONE) 20 MG tablet      2. Folliculitis  L73.9 doxycycline hyclate (VIBRAMYCIN) 100 MG capsule        Dermatitis rash to hands, feet, back and arms with progression to a folliculitis on left shin.  For dermatitis we will treat with hydroxyzine, may take 1 tablet by mouth up to 3 times daily for itching (reviewed risk, benefits and side effects of medication (insomnia/fatigue in particular).  We will also prescribe prednisone to help with inflammation and itching, will prescribe in a tapered fashion, this was reviewed verbally with patient and on prescription.  Reviewed risk, benefits and side effect of medication, recommend to take in the morning with food.  For folliculitis will cover with doxycycline for broad-spectrum coverage, 1 capsule by mouth twice daily for 7 days (avoid taking multivitamin or consumption of dairy at same time as antibiotic).  Wound care with triple antibiotic ointment to open sores. Remainder of wound care reviewed verbally. Return precautions reviewed. Patient is in agreement and understanding of the above treatment plan. All questions and concerns were addressed and answered to patient's satisfaction. AVS reviewed with patient.     BMI:   Estimated body mass index is 62.49 kg/m  as calculated from the following:    Height as of this encounter: 1.651 m (5' 5\").    Weight as of this encounter: 170.3 kg (375 lb 8 oz).   Weight management plan: Discussed healthy diet and exercise guidelines    See Patient Instructions    No follow-ups on file.    Jenni Gonzalez PA-C  [facility] AND [facility]    Alissa Del Angel is a 38 year old, presenting for the following health issues:  Derm Problem      10/24/2023     8:31 AM   Additional Questions   Roomed by Tiffany LEBLANC   Accompanied by Mom     Presents to the clinic today with her mother for concerns of rash for 2 to 3 days duration.  " Rash is of insidious onset.  No known exposures to dyes, perfumes, detergents or medications.  Rash initially started on her palms and in between her toes and has spread throughout her entire body (back, arms, legs and chest).  Rash is quite itchy in nature and is painful, currently rated a 6 out of 10 for itching and pain both.  She declines any fevers, chills, cough or congestion.  She was traveling between [place] and [place] when the rash onset, she was staying in hotels.  No known exposures to bedbugs, scabies or other acute pathology.  No new foods.  As noted above, she was staying in hotels, therefore she did have exposures to dyes and detergents that were different than her baseline at home.  She has been utilizing Benadryl and hydrocortisone cream with no relief of symptoms. No insect/tick or animal bites. No known exposure to bed bugs or scabies.     History of Present Illness       Reason for visit:  My leg has been weeping for days.  Symptom onset:  3-7 days ago  Symptoms include:  Fluid coming out of leg  Symptom intensity:  Moderate  Symptom progression:  Staying the same  Had these symptoms before:  No    She eats 2-3 servings of fruits and vegetables daily.She consumes 2 sweetened beverage(s) daily.She exercises with enough effort to increase her heart rate 10 to 19 minutes per day.  She exercises with enough effort to increase her heart rate 4 days per week. She is missing 1 dose(s) of medications per week.  She is not taking prescribed medications regularly due to remembering to take.    Concern - Rash all over body   Onset: 2 days ago   Description:  Raised, red, itchy, pins and needle feeling  Intensity: severe  Progression of Symptoms:  worsening  Accompanying Signs & Symptoms: N/A  Previous history of similar problem: N/A  Precipitating factors:        Worsened by: N/A  Alleviating factors:        Improved by: N/A  Therapies tried and outcome: Benadryl, cortisone 10 cream     Review of Systems  "  Constitutional, HEENT, cardiovascular, pulmonary, GI, , musculoskeletal, neuro, skin, endocrine and psych systems are negative, except as otherwise noted.        Objective    BP (!) 148/92 (BP Location: Left arm, Patient Position: Sitting, Cuff Size: Adult Large)   Pulse 91   Temp 96.9  F (36.1  C) (Temporal)   Resp 20   Ht 1.651 m (5' 5\")   Wt (!) 170.3 kg (375 lb 8 oz)   LMP 10/10/2023 (Approximate)   SpO2 97%   BMI 62.49 kg/m    Body mass index is 62.49 kg/m .  Physical Exam   GENERAL: healthy, alert and no distress  EYES: Eyes grossly normal to inspection, PERRL and conjunctivae and sclerae normal  RESP: lungs clear to auscultation - no rales, rhonchi or wheezes  CV: regular rate and rhythm, normal S1 S2, no S3 or S4, no murmur, click or rub, no peripheral edema and peripheral pulses strong  SKIN: erythematous, mildly raised, pruritic rash to interdigital space of hands and feet, forearms, chest, back and legs.  There are small, erythematous inflamed follicles to anterior distal left shin. No burrows or excoriations.   PSYCH: mentation appears normal, affect normal/bright        "

## 2023-10-24 NOTE — PATIENT INSTRUCTIONS
You were prescribed an antibiotic, please take into consideration the following information:  - Take entire course of antibiotic even if you start to feel better.  - Antibiotics can cause stomach upset including nausea and diarrhea. Read your bottle or ask the pharmacist if antibiotic can be taken with food to help prevent nausea. If you have symptoms of diarrhea you can take an over-the-counter probiotic and/or increase foods with probiotics such as yogurt, Jc, sauerkraut.  -Use caution in sunlight as can lead to increased risk of sunburn while on ABX (antibiotics).     -If you received a prescription for a topical or oral antibiotic, please take/use as directed.   -Keep the area clean and dry.   -If needed - for pain control - we recommend alternating Tylenol and Ibuprofen if you are able to take these medications. Alternate every 4 hours as needed. I.e.: Ibuprofen at 8am, Tylenol 12pm, Ibuprofen 4pm    -Daily maximum of Tylenol is 4000mg (recommend staying under 3000mg)   -Daily maximum of Ibuprofen is 3200 mg    Watch for worsening symptoms such as fevers, chills, worsening redness, abnormal drainage from site of cellulitis, etc.

## 2023-10-24 NOTE — NURSING NOTE
"Patient presents to the clinic for derm problem- rash     FOOD SECURITY SCREENING QUESTIONS:    The next two questions are to help us understand your food security.  If you are feeling you need any assistance in this area, we have resources available to support you today.    Hunger Vital Signs:  Within the past 12 months we worried whether our food would run out before we got money to buy more. Never  Within the past 12 months the food we bought just didn't last and we didn't have money to get more. Never    Advance Care Directive on file? No  Advance Care Directive provided to patient? Declined     Chief Complaint   Patient presents with    Derm Problem       Initial BP (!) 148/92 (BP Location: Left arm, Patient Position: Sitting, Cuff Size: Adult Large)   Pulse 91   Temp 96.9  F (36.1  C) (Temporal)   Resp 20   Ht 1.651 m (5' 5\")   Wt (!) 170.3 kg (375 lb 8 oz)   LMP 10/10/2023 (Approximate)   SpO2 97%   BMI 62.49 kg/m   Estimated body mass index is 62.49 kg/m  as calculated from the following:    Height as of this encounter: 1.651 m (5' 5\").    Weight as of this encounter: 170.3 kg (375 lb 8 oz).  Medication Reconciliation: complete        Tiffany Jose LPN on 10/24/2023 at 8:34 AM    "

## 2023-10-26 ENCOUNTER — MYC MEDICAL ADVICE (OUTPATIENT)
Dept: FAMILY MEDICINE | Facility: OTHER | Age: 38
End: 2023-10-26
Payer: COMMERCIAL

## 2023-10-26 ENCOUNTER — OFFICE VISIT (OUTPATIENT)
Dept: FAMILY MEDICINE | Facility: OTHER | Age: 38
End: 2023-10-26
Attending: NURSE PRACTITIONER
Payer: COMMERCIAL

## 2023-10-26 VITALS
BODY MASS INDEX: 48.82 KG/M2 | WEIGHT: 293 LBS | SYSTOLIC BLOOD PRESSURE: 168 MMHG | DIASTOLIC BLOOD PRESSURE: 72 MMHG | HEIGHT: 65 IN | TEMPERATURE: 97.5 F | OXYGEN SATURATION: 95 % | RESPIRATION RATE: 18 BRPM | HEART RATE: 96 BPM

## 2023-10-26 DIAGNOSIS — R21 RASH: Primary | ICD-10-CM

## 2023-10-26 DIAGNOSIS — J02.9 ACUTE PHARYNGITIS, UNSPECIFIED ETIOLOGY: ICD-10-CM

## 2023-10-26 DIAGNOSIS — R73.9 ELEVATED RANDOM BLOOD GLUCOSE LEVEL: ICD-10-CM

## 2023-10-26 DIAGNOSIS — R21 DIFFUSE PAPULAR RASH: ICD-10-CM

## 2023-10-26 DIAGNOSIS — L30.9 DERMATITIS: ICD-10-CM

## 2023-10-26 LAB
ALBUMIN SERPL BCG-MCNC: 3.8 G/DL (ref 3.5–5.2)
ALP SERPL-CCNC: 91 U/L (ref 35–104)
ALT SERPL W P-5'-P-CCNC: 37 U/L (ref 0–50)
ANION GAP SERPL CALCULATED.3IONS-SCNC: 8 MMOL/L (ref 7–15)
AST SERPL W P-5'-P-CCNC: 20 U/L (ref 0–45)
BASOPHILS # BLD AUTO: 0 10E3/UL (ref 0–0.2)
BASOPHILS NFR BLD AUTO: 0 %
BILIRUB SERPL-MCNC: 0.2 MG/DL
BUN SERPL-MCNC: 20.7 MG/DL (ref 6–20)
CALCIUM SERPL-MCNC: 9.1 MG/DL (ref 8.6–10)
CHLORIDE SERPL-SCNC: 106 MMOL/L (ref 98–107)
CREAT SERPL-MCNC: 0.84 MG/DL (ref 0.51–0.95)
CRP SERPL-MCNC: <3 MG/L
DEPRECATED HCO3 PLAS-SCNC: 25 MMOL/L (ref 22–29)
EGFRCR SERPLBLD CKD-EPI 2021: >90 ML/MIN/1.73M2
EOSINOPHIL # BLD AUTO: 0.3 10E3/UL (ref 0–0.7)
EOSINOPHIL NFR BLD AUTO: 3 %
ERYTHROCYTE [DISTWIDTH] IN BLOOD BY AUTOMATED COUNT: 15.7 % (ref 10–15)
ERYTHROCYTE [SEDIMENTATION RATE] IN BLOOD BY WESTERGREN METHOD: 48 MM/HR (ref 0–20)
GLUCOSE SERPL-MCNC: 163 MG/DL (ref 70–99)
GROUP A STREP BY PCR: NOT DETECTED
HBA1C MFR BLD: 6 % (ref 4–6.2)
HCT VFR BLD AUTO: 39.3 % (ref 35–47)
HGB BLD-MCNC: 12.5 G/DL (ref 11.7–15.7)
IMM GRANULOCYTES # BLD: 0.3 10E3/UL
IMM GRANULOCYTES NFR BLD: 3 %
LYMPHOCYTES # BLD AUTO: 1.9 10E3/UL (ref 0.8–5.3)
LYMPHOCYTES NFR BLD AUTO: 20 %
MCH RBC QN AUTO: 28 PG (ref 26.5–33)
MCHC RBC AUTO-ENTMCNC: 31.8 G/DL (ref 31.5–36.5)
MCV RBC AUTO: 88 FL (ref 78–100)
MONOCYTES # BLD AUTO: 0.4 10E3/UL (ref 0–1.3)
MONOCYTES NFR BLD AUTO: 4 %
NEUTROPHILS # BLD AUTO: 6.6 10E3/UL (ref 1.6–8.3)
NEUTROPHILS NFR BLD AUTO: 70 %
NRBC # BLD AUTO: 0 10E3/UL
NRBC BLD AUTO-RTO: 0 /100
PLATELET # BLD AUTO: 204 10E3/UL (ref 150–450)
POTASSIUM SERPL-SCNC: 4.3 MMOL/L (ref 3.4–5.3)
PROT SERPL-MCNC: 7.1 G/DL (ref 6.4–8.3)
RBC # BLD AUTO: 4.46 10E6/UL (ref 3.8–5.2)
SODIUM SERPL-SCNC: 139 MMOL/L (ref 135–145)
WBC # BLD AUTO: 9.4 10E3/UL (ref 4–11)

## 2023-10-26 PROCEDURE — 83036 HEMOGLOBIN GLYCOSYLATED A1C: CPT | Mod: ZL | Performed by: NURSE PRACTITIONER

## 2023-10-26 PROCEDURE — 85652 RBC SED RATE AUTOMATED: CPT | Mod: ZL | Performed by: NURSE PRACTITIONER

## 2023-10-26 PROCEDURE — 87651 STREP A DNA AMP PROBE: CPT | Mod: ZL | Performed by: NURSE PRACTITIONER

## 2023-10-26 PROCEDURE — 80053 COMPREHEN METABOLIC PANEL: CPT | Mod: ZL | Performed by: NURSE PRACTITIONER

## 2023-10-26 PROCEDURE — 99214 OFFICE O/P EST MOD 30 MIN: CPT | Performed by: NURSE PRACTITIONER

## 2023-10-26 PROCEDURE — 86140 C-REACTIVE PROTEIN: CPT | Mod: ZL | Performed by: NURSE PRACTITIONER

## 2023-10-26 PROCEDURE — 36415 COLL VENOUS BLD VENIPUNCTURE: CPT | Mod: ZL | Performed by: NURSE PRACTITIONER

## 2023-10-26 PROCEDURE — 85025 COMPLETE CBC W/AUTO DIFF WBC: CPT | Mod: ZL | Performed by: NURSE PRACTITIONER

## 2023-10-26 ASSESSMENT — PATIENT HEALTH QUESTIONNAIRE - PHQ9
10. IF YOU CHECKED OFF ANY PROBLEMS, HOW DIFFICULT HAVE THESE PROBLEMS MADE IT FOR YOU TO DO YOUR WORK, TAKE CARE OF THINGS AT HOME, OR GET ALONG WITH OTHER PEOPLE: SOMEWHAT DIFFICULT
SUM OF ALL RESPONSES TO PHQ QUESTIONS 1-9: 11
SUM OF ALL RESPONSES TO PHQ QUESTIONS 1-9: 11

## 2023-10-26 NOTE — PROGRESS NOTES
ASSESSMENT/PLAN:    I have reviewed the nursing notes.  I have reviewed the findings, diagnosis, plan and need for follow up with the patient.    1. Rash  - CRP inflammation  - Sedimentation Rate (ESR)  - CBC and Differential  - Comprehensive Metabolic Panel  - Group A Streptococcus PCR Throat Swab  - Adult Dermatology  Referral; Future  Sed rate is mildly elevated today , normal CRP. Normal blood counts and renal function is reassuring.     2. Acute pharyngitis, unspecified etiology  - Group A Streptococcus PCR Throat Swab  Negative strep; did not strongly suspect but given sore throat and recent travel along with this rash (not typical strep rash) wanted to consider as doxycycline would not treat strep pharyngitis.     3. Dermatitis  4. Diffuse papular rash  - Adult Dermatology  Referral; Future  Bean was seen 2 days ago and diagnosed with dermatitis and folliculitis, started on prednisone, hydroxyzine, and doxycycline. I recommend continuing current medications. I do not strongly suspect bacterial etiology for rash.   Differential diagnosis at this time includes: drug eruption/allergy (prilosec?), dermatitis progressed to folliculitis, vasculitis, viral rash such as severe case of hand, foot, and mouth (less likely). I recommend stopping prilosec, which was started on 10/11/2022 about 10 days prior to onset of this rash. Understand this is less common reaction to prilosec but cannot rule out based on timeline. I have placed urgent referral to local dermatology office as this may need to be biopsied. Would prefer their insight to this. Patient is receptive. ER if severe symptoms or changes in condition.     5. Elevated random blood glucose level  - Hemoglobin A1c  A1C is 6. No diabetes diagnosis but recommend lifestyle modifications with diet and exercise, recommend physical exam with PCP. Random blood glucose was 163 on cmp today, thus added A1C. Patient has BMI of 62.44.     Discussed warning  signs/symptoms indicative of need to f/u     Follow up if symptoms persist or worsen or concerns    I explained my diagnostic considerations and recommendations to the patient, who voiced understanding and agreement with the treatment plan. All questions were answered. We discussed potential side effects of any prescribed or recommended therapies, as well as expectations for response to treatments.    Soumya Whittington NP  10/26/2023  3:13 PM    HPI:  Bean Garcia is a 38 year old female who presents to [facility] today for concerns of progressive/worsening rash on whole body since 10/22/2023 Varun with insidious onset. Was seen by Jenni Gonzalez 2 days ago and started prednisone, hydroxyzine, and doxycycline for suspected dermatitis with progression to folliculitis.      Started on feet/ toes bilaterally and palms of hands, has spread. It is painful and itchy also. She feels a bit nauseous.  It is all over her body.     Went on a trip to [place] and [place] just recently, while on the trip is when this happened. She did start prilosec which was a new medication for her about 1 week prior to the onset of rash - noted upon further investigation.   Prilosec started on 10/11/2023.    She feels tired. Throat is mildly sore. She does not have young children. Or known exposures to illness including hand, foot, and mouth disease but did just travel via plane. Had a negative covid test following onset of rash and symptoms.     No known tick bites.     She has never experienced a rash like this.     Past Medical History:   Diagnosis Date    Mild intermittent asthma without complication      History reviewed. No pertinent surgical history.  Social History     Tobacco Use    Smoking status: Never     Passive exposure: Never    Smokeless tobacco: Never   Substance Use Topics    Alcohol use: No     Current Outpatient Medications   Medication Sig Dispense Refill    albuterol (PROAIR HFA/PROVENTIL HFA/VENTOLIN HFA) 108 (90 Base)  "MCG/ACT inhaler Inhale 2 puffs into the lungs every 6 hours as needed for shortness of breath / dyspnea or wheezing 18 g 11    doxycycline hyclate (VIBRAMYCIN) 100 MG capsule Take 1 capsule (100 mg) by mouth 2 times daily for 7 days 14 capsule 0    fluticasone (FLOVENT HFA) 110 MCG/ACT inhaler Inhale 1 puff into the lungs 2 times daily 12 g 11    hydrOXYzine (ATARAX) 25 MG tablet Take 1 tablet (25 mg) by mouth 3 times daily as needed for itching 30 tablet 0    omeprazole (PRILOSEC) 20 MG DR capsule Take 20 mg by mouth      predniSONE (DELTASONE) 20 MG tablet Take 2 tablets (40 mg) by mouth daily for 3 days, THEN 1 tablet (20 mg) daily for 3 days, THEN 0.5 tablets (10 mg) daily for 3 days. 11 tablet 0    sertraline (ZOLOFT) 100 MG tablet Take 1 tablet (100 mg) by mouth daily 90 tablet 3    traZODone (DESYREL) 50 MG tablet Take 1 tablet (50 mg) by mouth At Bedtime 90 tablet 3     Allergies   Allergen Reactions    Banana Nausea and Vomiting    Cats     Dogs     Dust Mites     Milk-Related Compounds Nausea and Vomiting and Diarrhea    Amoxicillin Swelling and Rash     Other reaction(s): Edema  joints     Past medical history, past surgical history, current medications and allergies reviewed and accurate to the best of my knowledge.      ROS:  Refer to HPI    BP (!) 168/72   Pulse 96   Temp 97.5  F (36.4  C) (Temporal)   Resp 18   Ht 1.651 m (5' 5\")   Wt (!) 170.2 kg (375 lb 3.2 oz)   LMP 10/10/2023 (Approximate)   SpO2 95%   BMI 62.44 kg/m      EXAM:  General Appearance: Well appearing 38 year old female, appropriate appearance for age. No acute distress   Eyes: + bilateral eyes with surrounding yellow crusting. No erythema of conjunctivae.    Oropharynx: moist mucous membranes, + posterior pharynx with mild erythema, tonsils symmetric, no erythema, no exudates or petechiae, no post nasal drip seen, no trismus, voice clear.    Respiratory: normal chest wall and respirations.  Normal effort.  Clear to " auscultation bilaterally, no wheezing, crackles or rhonchi.  No increased work of breathing.  No cough appreciated.  Cardiac: RRR with no murmurs  Musculoskeletal:  Equal movement of bilateral upper extremities.  Equal movement of bilateral lower extremities.  Normal gait.    Dermatological: + see below photos: erythematous maculopapular diffuse rash observed on abdomen, back, bilateral hands, feet, and arms/legs   Neuro: Alert and oriented to person, place, and time.    Psychological: normal affect, alert, oriented, and pleasant.     Answers submitted by the patient for this visit:  Patient Health Questionnaire (Submitted on 10/26/2023)  If you checked off any problems, how difficult have these problems made it for you to do your work, take care of things at home, or get along with other people?: Somewhat difficult  PHQ9 TOTAL SCORE: 11                Results for orders placed or performed in visit on 10/26/23   CRP inflammation     Status: Normal   Result Value Ref Range    CRP Inflammation <3.00 <5.00 mg/L   Sedimentation Rate (ESR)     Status: Abnormal   Result Value Ref Range    Erythrocyte Sedimentation Rate 48 (H) 0 - 20 mm/hr   Comprehensive Metabolic Panel     Status: Abnormal   Result Value Ref Range    Sodium 139 135 - 145 mmol/L    Potassium 4.3 3.4 - 5.3 mmol/L    Carbon Dioxide (CO2) 25 22 - 29 mmol/L    Anion Gap 8 7 - 15 mmol/L    Urea Nitrogen 20.7 (H) 6.0 - 20.0 mg/dL    Creatinine 0.84 0.51 - 0.95 mg/dL    GFR Estimate >90 >60 mL/min/1.73m2    Calcium 9.1 8.6 - 10.0 mg/dL    Chloride 106 98 - 107 mmol/L    Glucose 163 (H) 70 - 99 mg/dL    Alkaline Phosphatase 91 35 - 104 U/L    AST 20 0 - 45 U/L    ALT 37 0 - 50 U/L    Protein Total 7.1 6.4 - 8.3 g/dL    Albumin 3.8 3.5 - 5.2 g/dL    Bilirubin Total 0.2 <=1.2 mg/dL   CBC with platelets and differential     Status: Abnormal   Result Value Ref Range    WBC Count 9.4 4.0 - 11.0 10e3/uL    RBC Count 4.46 3.80 - 5.20 10e6/uL    Hemoglobin 12.5 11.7 -  15.7 g/dL    Hematocrit 39.3 35.0 - 47.0 %    MCV 88 78 - 100 fL    MCH 28.0 26.5 - 33.0 pg    MCHC 31.8 31.5 - 36.5 g/dL    RDW 15.7 (H) 10.0 - 15.0 %    Platelet Count 204 150 - 450 10e3/uL    % Neutrophils 70 %    % Lymphocytes 20 %    % Monocytes 4 %    % Eosinophils 3 %    % Basophils 0 %    % Immature Granulocytes 3 %    NRBCs per 100 WBC 0 <1 /100    Absolute Neutrophils 6.6 1.6 - 8.3 10e3/uL    Absolute Lymphocytes 1.9 0.8 - 5.3 10e3/uL    Absolute Monocytes 0.4 0.0 - 1.3 10e3/uL    Absolute Eosinophils 0.3 0.0 - 0.7 10e3/uL    Absolute Basophils 0.0 0.0 - 0.2 10e3/uL    Absolute Immature Granulocytes 0.3 <=0.4 10e3/uL    Absolute NRBCs 0.0 10e3/uL   Hemoglobin A1c     Status: Normal   Result Value Ref Range    Hemoglobin A1C 6.0 4.0 - 6.2 %   Group A Streptococcus PCR Throat Swab     Status: Normal    Specimen: Throat; Swab   Result Value Ref Range    Group A strep by PCR Not Detected Not Detected    Narrative    The Xpert Xpress Strep A test, performed on the [employer], is a rapid, qualitative in vitro diagnostic test for the detection of Streptococcus pyogenes (Group A ß-hemolytic Streptococcus, Strep A) in throat swab specimens from patients with signs and symptoms of pharyngitis. The Xpert Xpress Strep A test can be used as an aid in the diagnosis of Group A Streptococcal pharyngitis. The assay is not intended to monitor treatment for Group A Streptococcus infections. The Xpert Xpress Strep A test utilizes an automated real-time polymerase chain reaction (PCR) to detect Streptococcus pyogenes DNA.   CBC and Differential     Status: Abnormal    Narrative    The following orders were created for panel order CBC and Differential.  Procedure                               Abnormality         Status                     ---------                               -----------         ------                     CBC with platelets and d...[[id number]]  Abnormal            Final result                  Please view results for these tests on the individual orders.

## 2023-10-26 NOTE — TELEPHONE ENCOUNTER
Routing comment from Jenni Gonzalez:    CL  She should be seen if worsening.     [facility]    Phoebe Oleary RN on 10/26/2023 at 1:02 PM

## 2023-10-26 NOTE — PATIENT INSTRUCTIONS
Stop prilosec. I do not feel the doxycycline is needed at this time based on how widespread the rash is now, and you could stop this until seen by dermatology.     CONTINUE prednisone and hydroxyzine.     Recommend urgent derm referral. This rash may need to be biopsied potentially.     Inflammatory markers are mildly elevated (sed rate is 48) CRP is NORMAL.   Normal blood counts. Normal kidney function.     Discussed possibilities of rash could be viral given the goopy eyes and sore throat, dermatitis from exposure to other new products during travel, or possibly a less common adverse reaction related to recent initiation of prilosec.

## 2023-10-26 NOTE — NURSING NOTE
"Chief Complaint   Patient presents with    Derm Problem     Rash on whole body since 10/22/23         Initial LMP 10/10/2023 (Approximate)  Estimated body mass index is 62.49 kg/m  as calculated from the following:    Height as of 10/24/23: 1.651 m (5' 5\").    Weight as of 10/24/23: 170.3 kg (375 lb 8 oz).     Advance Care Directive on file? no  Advance Care Directive provided to patient? no    FOOD SECURITY SCREENING QUESTIONS:    The next two questions are to help us understand your food security.  If you are feeling you need any assistance in this area, we have resources available to support you today.    Hunger Vital Signs:  Within the past 12 months we worried whether our food would run out before we got money to buy more. Never  Within the past 12 months the food we bought just didn't last and we didn't have money to get more. Never  Janene Lynch LPN on 10/26/2023 at 3:04 PM      Janene Lynch     "

## 2023-11-02 ENCOUNTER — LAB (OUTPATIENT)
Dept: LAB | Facility: OTHER | Age: 38
End: 2023-11-02
Attending: FAMILY MEDICINE
Payer: COMMERCIAL

## 2023-11-02 DIAGNOSIS — L30.9 ACUTE DERMATITIS: Primary | ICD-10-CM

## 2023-11-02 LAB
ALBUMIN SERPL BCG-MCNC: 3.8 G/DL (ref 3.5–5.2)
ALBUMIN UR-MCNC: 30 MG/DL
ALP SERPL-CCNC: 111 U/L (ref 35–104)
ALT SERPL W P-5'-P-CCNC: 35 U/L (ref 0–50)
ANION GAP SERPL CALCULATED.3IONS-SCNC: 9 MMOL/L (ref 7–15)
APPEARANCE UR: ABNORMAL
AST SERPL W P-5'-P-CCNC: 25 U/L (ref 0–45)
BASOPHILS # BLD AUTO: 0 10E3/UL (ref 0–0.2)
BASOPHILS NFR BLD AUTO: 0 %
BILIRUB SERPL-MCNC: 0.4 MG/DL
BILIRUB UR QL STRIP: NEGATIVE
BUN SERPL-MCNC: 20.5 MG/DL (ref 6–20)
CALCIUM SERPL-MCNC: 9.2 MG/DL (ref 8.6–10)
CHLORIDE SERPL-SCNC: 106 MMOL/L (ref 98–107)
COLOR UR AUTO: YELLOW
CREAT SERPL-MCNC: 0.99 MG/DL (ref 0.51–0.95)
CRP SERPL-MCNC: 11.03 MG/L
DEPRECATED HCO3 PLAS-SCNC: 27 MMOL/L (ref 22–29)
EGFRCR SERPLBLD CKD-EPI 2021: 75 ML/MIN/1.73M2
EOSINOPHIL # BLD AUTO: 0.6 10E3/UL (ref 0–0.7)
EOSINOPHIL NFR BLD AUTO: 5 %
ERYTHROCYTE [DISTWIDTH] IN BLOOD BY AUTOMATED COUNT: 15.7 % (ref 10–15)
ERYTHROCYTE [SEDIMENTATION RATE] IN BLOOD BY WESTERGREN METHOD: 42 MM/HR (ref 0–20)
GLUCOSE SERPL-MCNC: 113 MG/DL (ref 70–99)
GLUCOSE UR STRIP-MCNC: NEGATIVE MG/DL
HCT VFR BLD AUTO: 39.7 % (ref 35–47)
HGB BLD-MCNC: 12.7 G/DL (ref 11.7–15.7)
HGB UR QL STRIP: ABNORMAL
HOLD SPECIMEN: NORMAL
HYALINE CASTS: 4 /LPF
IMM GRANULOCYTES # BLD: 0.1 10E3/UL
IMM GRANULOCYTES NFR BLD: 1 %
KETONES UR STRIP-MCNC: NEGATIVE MG/DL
LEUKOCYTE ESTERASE UR QL STRIP: NEGATIVE
LYMPHOCYTES # BLD AUTO: 2.9 10E3/UL (ref 0.8–5.3)
LYMPHOCYTES NFR BLD AUTO: 27 %
MCH RBC QN AUTO: 28.2 PG (ref 26.5–33)
MCHC RBC AUTO-ENTMCNC: 32 G/DL (ref 31.5–36.5)
MCV RBC AUTO: 88 FL (ref 78–100)
MONOCYTES # BLD AUTO: 0.9 10E3/UL (ref 0–1.3)
MONOCYTES NFR BLD AUTO: 8 %
NEUTROPHILS # BLD AUTO: 6.2 10E3/UL (ref 1.6–8.3)
NEUTROPHILS NFR BLD AUTO: 59 %
NITRATE UR QL: NEGATIVE
NRBC # BLD AUTO: 0 10E3/UL
NRBC BLD AUTO-RTO: 0 /100
PH UR STRIP: 6 [PH] (ref 5–9)
PLATELET # BLD AUTO: 236 10E3/UL (ref 150–450)
POTASSIUM SERPL-SCNC: 4 MMOL/L (ref 3.4–5.3)
PROT SERPL-MCNC: 6.9 G/DL (ref 6.4–8.3)
RBC # BLD AUTO: 4.5 10E6/UL (ref 3.8–5.2)
RBC URINE: >182 /HPF
SODIUM SERPL-SCNC: 142 MMOL/L (ref 135–145)
SP GR UR STRIP: 1.03 (ref 1–1.03)
UROBILINOGEN UR STRIP-MCNC: NORMAL MG/DL
WBC # BLD AUTO: 10.7 10E3/UL (ref 4–11)
WBC URINE: 0 /HPF

## 2023-11-02 PROCEDURE — 86235 NUCLEAR ANTIGEN ANTIBODY: CPT | Mod: ZL

## 2023-11-02 PROCEDURE — 87340 HEPATITIS B SURFACE AG IA: CPT | Mod: ZL

## 2023-11-02 PROCEDURE — 87389 HIV-1 AG W/HIV-1&-2 AB AG IA: CPT | Mod: ZL

## 2023-11-02 PROCEDURE — 86140 C-REACTIVE PROTEIN: CPT | Mod: ZL

## 2023-11-02 PROCEDURE — 86038 ANTINUCLEAR ANTIBODIES: CPT | Mod: ZL

## 2023-11-02 PROCEDURE — 86160 COMPLEMENT ANTIGEN: CPT | Mod: ZL

## 2023-11-02 PROCEDURE — 83876 ASSAY MYELOPEROXIDASE: CPT | Mod: ZL

## 2023-11-02 PROCEDURE — 86060 ANTISTREPTOLYSIN O TITER: CPT | Mod: ZL

## 2023-11-02 PROCEDURE — 81003 URINALYSIS AUTO W/O SCOPE: CPT | Mod: ZL

## 2023-11-02 PROCEDURE — 85025 COMPLETE CBC W/AUTO DIFF WBC: CPT | Mod: ZL

## 2023-11-02 PROCEDURE — 86803 HEPATITIS C AB TEST: CPT | Mod: ZL

## 2023-11-02 PROCEDURE — 80053 COMPREHEN METABOLIC PANEL: CPT | Mod: ZL

## 2023-11-02 PROCEDURE — 36415 COLL VENOUS BLD VENIPUNCTURE: CPT | Mod: ZL

## 2023-11-02 PROCEDURE — 85652 RBC SED RATE AUTOMATED: CPT | Mod: ZL

## 2023-11-04 LAB
HBV SURFACE AG SERPL QL IA: NONREACTIVE
HCV AB SERPL QL IA: NONREACTIVE
HIV 1+2 AB+HIV1 P24 AG SERPL QL IA: NONREACTIVE

## 2023-11-05 LAB — ASO AB SERPL-ACNC: 202 IU/ML

## 2023-11-06 ENCOUNTER — E-VISIT (OUTPATIENT)
Dept: FAMILY MEDICINE | Facility: OTHER | Age: 38
End: 2023-11-06
Payer: COMMERCIAL

## 2023-11-06 DIAGNOSIS — L30.9 DERMATITIS: Primary | ICD-10-CM

## 2023-11-06 LAB
ANA SER QL IF: NEGATIVE
C3 SERPL-MCNC: 208 MG/DL (ref 81–157)
C4 SERPL-MCNC: 45 MG/DL (ref 13–39)

## 2023-11-06 PROCEDURE — 99421 OL DIG E/M SVC 5-10 MIN: CPT | Performed by: FAMILY MEDICINE

## 2023-11-07 LAB
ENA SS-A AB SER IA-ACNC: <0.5 U/ML
ENA SS-A AB SER IA-ACNC: NEGATIVE
ENA SS-B IGG SER IA-ACNC: <0.6 U/ML
ENA SS-B IGG SER IA-ACNC: NEGATIVE
MYELOPEROXIDASE AB SER IA-ACNC: <0.3 U/ML
MYELOPEROXIDASE AB SER IA-ACNC: NEGATIVE
PROTEINASE3 AB SER IA-ACNC: <1 U/ML
PROTEINASE3 AB SER IA-ACNC: NEGATIVE

## 2023-11-07 RX ORDER — TRIAMCINOLONE ACETONIDE 1 MG/G
CREAM TOPICAL
Qty: 30 G | Refills: 1 | Status: SHIPPED | OUTPATIENT
Start: 2023-11-07 | End: 2023-12-01

## 2023-11-08 ENCOUNTER — LAB (OUTPATIENT)
Dept: LAB | Facility: OTHER | Age: 38
End: 2023-11-08
Attending: FAMILY MEDICINE
Payer: COMMERCIAL

## 2023-11-08 DIAGNOSIS — L30.9 DERMATITIS, UNSPECIFIED: Primary | ICD-10-CM

## 2023-11-08 LAB
CREAT SERPL-MCNC: 0.98 MG/DL (ref 0.51–0.95)
EGFRCR SERPLBLD CKD-EPI 2021: 76 ML/MIN/1.73M2

## 2023-11-08 PROCEDURE — 82565 ASSAY OF CREATININE: CPT | Mod: ZL

## 2023-11-08 PROCEDURE — 36415 COLL VENOUS BLD VENIPUNCTURE: CPT | Mod: ZL

## 2023-11-10 ENCOUNTER — LAB (OUTPATIENT)
Dept: LAB | Facility: OTHER | Age: 38
End: 2023-11-10
Payer: COMMERCIAL

## 2023-11-10 DIAGNOSIS — L30.9 DERMATITIS, UNSPECIFIED: ICD-10-CM

## 2023-11-10 LAB
ALBUMIN UR-MCNC: NEGATIVE MG/DL
APPEARANCE UR: CLEAR
BILIRUB UR QL STRIP: NEGATIVE
COLOR UR AUTO: ABNORMAL
GLUCOSE UR STRIP-MCNC: 30 MG/DL
HGB UR QL STRIP: NEGATIVE
KETONES UR STRIP-MCNC: NEGATIVE MG/DL
LEUKOCYTE ESTERASE UR QL STRIP: NEGATIVE
NITRATE UR QL: NEGATIVE
PH UR STRIP: 6 [PH] (ref 5–9)
SP GR UR STRIP: 1.02 (ref 1–1.03)
UROBILINOGEN UR STRIP-MCNC: NORMAL MG/DL

## 2023-11-10 PROCEDURE — 81003 URINALYSIS AUTO W/O SCOPE: CPT | Mod: ZL

## 2023-11-29 ENCOUNTER — E-VISIT (OUTPATIENT)
Dept: FAMILY MEDICINE | Facility: OTHER | Age: 38
End: 2023-11-29
Payer: COMMERCIAL

## 2023-11-29 ENCOUNTER — NURSE TRIAGE (OUTPATIENT)
Dept: FAMILY MEDICINE | Facility: OTHER | Age: 38
End: 2023-11-29

## 2023-11-29 ENCOUNTER — MYC REFILL (OUTPATIENT)
Dept: FAMILY MEDICINE | Facility: OTHER | Age: 38
End: 2023-11-29

## 2023-11-29 DIAGNOSIS — J45.20 MILD INTERMITTENT ASTHMA WITHOUT COMPLICATION: ICD-10-CM

## 2023-11-29 DIAGNOSIS — F41.9 ANXIETY AND DEPRESSION: Primary | ICD-10-CM

## 2023-11-29 DIAGNOSIS — F51.02 ADJUSTMENT INSOMNIA: ICD-10-CM

## 2023-11-29 DIAGNOSIS — F32.A ANXIETY AND DEPRESSION: Primary | ICD-10-CM

## 2023-11-29 RX ORDER — TRAZODONE HYDROCHLORIDE 50 MG/1
50 TABLET, FILM COATED ORAL AT BEDTIME
Qty: 90 TABLET | Refills: 3 | Status: CANCELLED | OUTPATIENT
Start: 2023-11-29

## 2023-11-29 RX ORDER — ALBUTEROL SULFATE 90 UG/1
2 AEROSOL, METERED RESPIRATORY (INHALATION) EVERY 6 HOURS PRN
Qty: 18 G | Refills: 11 | Status: CANCELLED | OUTPATIENT
Start: 2023-11-29

## 2023-11-29 RX ORDER — FLUTICASONE PROPIONATE 110 UG/1
1 AEROSOL, METERED RESPIRATORY (INHALATION) 2 TIMES DAILY
Qty: 12 G | Refills: 11 | Status: CANCELLED | OUTPATIENT
Start: 2023-11-29

## 2023-11-29 ASSESSMENT — PATIENT HEALTH QUESTIONNAIRE - PHQ9
SUM OF ALL RESPONSES TO PHQ QUESTIONS 1-9: 13
SUM OF ALL RESPONSES TO PHQ QUESTIONS 1-9: 13
10. IF YOU CHECKED OFF ANY PROBLEMS, HOW DIFFICULT HAVE THESE PROBLEMS MADE IT FOR YOU TO DO YOUR WORK, TAKE CARE OF THINGS AT HOME, OR GET ALONG WITH OTHER PEOPLE: SOMEWHAT DIFFICULT

## 2023-11-29 ASSESSMENT — ANXIETY QUESTIONNAIRES
5. BEING SO RESTLESS THAT IT IS HARD TO SIT STILL: NOT AT ALL
7. FEELING AFRAID AS IF SOMETHING AWFUL MIGHT HAPPEN: MORE THAN HALF THE DAYS
GAD7 TOTAL SCORE: 8
6. BECOMING EASILY ANNOYED OR IRRITABLE: SEVERAL DAYS
3. WORRYING TOO MUCH ABOUT DIFFERENT THINGS: SEVERAL DAYS
1. FEELING NERVOUS, ANXIOUS, OR ON EDGE: MORE THAN HALF THE DAYS
4. TROUBLE RELAXING: SEVERAL DAYS
GAD7 TOTAL SCORE: 8
2. NOT BEING ABLE TO STOP OR CONTROL WORRYING: SEVERAL DAYS
7. FEELING AFRAID AS IF SOMETHING AWFUL MIGHT HAPPEN: MORE THAN HALF THE DAYS
8. IF YOU CHECKED OFF ANY PROBLEMS, HOW DIFFICULT HAVE THESE MADE IT FOR YOU TO DO YOUR WORK, TAKE CARE OF THINGS AT HOME, OR GET ALONG WITH OTHER PEOPLE?: SOMEWHAT DIFFICULT
GAD7 TOTAL SCORE: 8

## 2023-11-29 NOTE — TELEPHONE ENCOUNTER
Patient noted to have e- visit. Provider is off on this date. Attempted to call patient. [phone]    Vivian Zarate RN on 11/29/2023 at 2:50 PM

## 2023-11-30 ENCOUNTER — MYC REFILL (OUTPATIENT)
Dept: FAMILY MEDICINE | Facility: OTHER | Age: 38
End: 2023-11-30
Payer: COMMERCIAL

## 2023-11-30 ENCOUNTER — TELEPHONE (OUTPATIENT)
Dept: FAMILY MEDICINE | Facility: OTHER | Age: 38
End: 2023-11-30
Payer: COMMERCIAL

## 2023-11-30 DIAGNOSIS — F41.9 ANXIETY AND DEPRESSION: ICD-10-CM

## 2023-11-30 DIAGNOSIS — F51.02 ADJUSTMENT INSOMNIA: ICD-10-CM

## 2023-11-30 DIAGNOSIS — J45.20 MILD INTERMITTENT ASTHMA WITHOUT COMPLICATION: ICD-10-CM

## 2023-11-30 DIAGNOSIS — F32.A ANXIETY AND DEPRESSION: ICD-10-CM

## 2023-11-30 RX ORDER — ALBUTEROL SULFATE 90 UG/1
2 AEROSOL, METERED RESPIRATORY (INHALATION) EVERY 6 HOURS PRN
Qty: 18 G | Refills: 11 | Status: CANCELLED | OUTPATIENT
Start: 2023-11-30

## 2023-11-30 RX ORDER — TRAZODONE HYDROCHLORIDE 50 MG/1
50 TABLET, FILM COATED ORAL AT BEDTIME
Qty: 90 TABLET | Refills: 3 | Status: CANCELLED | OUTPATIENT
Start: 2023-11-30

## 2023-11-30 RX ORDER — FLUTICASONE PROPIONATE 110 UG/1
1 AEROSOL, METERED RESPIRATORY (INHALATION) 2 TIMES DAILY
Qty: 12 G | Refills: 11 | Status: CANCELLED | OUTPATIENT
Start: 2023-11-30

## 2023-11-30 RX ORDER — SERTRALINE HYDROCHLORIDE 100 MG/1
100 TABLET, FILM COATED ORAL DAILY
Qty: 90 TABLET | Refills: 3 | Status: CANCELLED | OUTPATIENT
Start: 2023-11-30

## 2023-11-30 ASSESSMENT — PATIENT HEALTH QUESTIONNAIRE - PHQ9: SUM OF ALL RESPONSES TO PHQ QUESTIONS 1-9: 13

## 2023-11-30 ASSESSMENT — ANXIETY QUESTIONNAIRES: GAD7 TOTAL SCORE: 8

## 2023-11-30 NOTE — PATIENT INSTRUCTIONS
Errol Del Angel - I'm sorry to hear that you are struggling with your mental health.  I think it would be best to see you in person to address this.  I am going to have someone call you to help you to set up an appointment.  Helen Mccord MD

## 2023-11-30 NOTE — TELEPHONE ENCOUNTER
"Called and left message for patient to return call.  Per phone note for today  \"  Patient submitted an e-visit for depression, but needs to be seen in person.  Please call her to help facilitate an appointment.  MD Chitra Carranza RN on 11/30/2023 at 1:17 PM    "

## 2023-11-30 NOTE — TELEPHONE ENCOUNTER
Patient submitted an e-visit for depression, but needs to be seen in person.  Please call her to help facilitate an appointment.  Helen Mccord MD

## 2023-11-30 NOTE — TELEPHONE ENCOUNTER
Please use Provider Add spot tomorrow with [facility].      Soumya Broussard LPN on 11/30/2023 at 1:18 PM

## 2023-12-01 NOTE — TELEPHONE ENCOUNTER
Requested Prescriptions   Pending Prescriptions Disp Refills    sertraline (ZOLOFT) 100 MG tablet 90 tablet 3     Sig: Take 1 tablet (100 mg) by mouth daily       SSRIs Protocol Failed - 12/1/2023  4:14 PM        Failed - PHQ-9 score less than 5 in past 6 months     Please review last PHQ-9 score.        albuterol (PROAIR HFA/PROVENTIL HFA/VENTOLIN HFA) 108 (90 Base) MCG/ACT inhaler 18 g 11     Sig: Inhale 2 puffs into the lungs every 6 hours as needed for shortness of breath or wheezing       Asthma Maintenance Inhalers - Anticholinergics Failed - 12/1/2023  4:14 PM    Short-Acting Beta Agonist Inhalers Protocol  Failed - 12/1/2023  4:14 PM        Failed - Asthma control assessment score within normal limits in last 6 months     Please review ACT score.        fluticasone (FLOVENT HFA) 110 MCG/ACT inhaler 12 g 11     Sig: Inhale 1 puff into the lungs 2 times daily       Inhaled Steroids Protocol Failed - 11/30/2023  9:23 AM        Failed - Asthma control assessment score within normal limits in last 6 months     Please review ACT score.        traZODone (DESYREL) 50 MG tablet 90 tablet 3     Sig: Take 1 tablet (50 mg) by mouth at bedtime     See refill encounter, dated 11/28/23.    Flower Cummings RN .............. 12/1/2023  4:16 PM

## 2024-01-08 DIAGNOSIS — G47.33 OSA (OBSTRUCTIVE SLEEP APNEA): Primary | ICD-10-CM

## 2024-01-21 ENCOUNTER — MYC MEDICAL ADVICE (OUTPATIENT)
Dept: FAMILY MEDICINE | Facility: OTHER | Age: 39
End: 2024-01-21
Payer: COMMERCIAL

## 2024-01-22 ENCOUNTER — VIRTUAL VISIT (OUTPATIENT)
Dept: INTERNAL MEDICINE | Facility: OTHER | Age: 39
End: 2024-01-22
Attending: INTERNAL MEDICINE
Payer: COMMERCIAL

## 2024-01-22 DIAGNOSIS — U07.1 INFECTION DUE TO 2019 NOVEL CORONAVIRUS: Primary | ICD-10-CM

## 2024-01-22 PROCEDURE — 99441 PR PHYSICIAN TELEPHONE EVALUATION 5-10 MIN: CPT | Performed by: INTERNAL MEDICINE

## 2024-01-22 ASSESSMENT — ASTHMA QUESTIONNAIRES
QUESTION_4 LAST FOUR WEEKS HOW OFTEN HAVE YOU USED YOUR RESCUE INHALER OR NEBULIZER MEDICATION (SUCH AS ALBUTEROL): TWO OR THREE TIMES PER WEEK
QUESTION_2 LAST FOUR WEEKS HOW OFTEN HAVE YOU HAD SHORTNESS OF BREATH: THREE TO SIX TIMES A WEEK
QUESTION_5 LAST FOUR WEEKS HOW WOULD YOU RATE YOUR ASTHMA CONTROL: SOMEWHAT CONTROLLED
QUESTION_3 LAST FOUR WEEKS HOW OFTEN DID YOUR ASTHMA SYMPTOMS (WHEEZING, COUGHING, SHORTNESS OF BREATH, CHEST TIGHTNESS OR PAIN) WAKE YOU UP AT NIGHT OR EARLIER THAN USUAL IN THE MORNING: ONCE A WEEK
ACT_TOTALSCORE: 16
ACT_TOTALSCORE: 16
QUESTION_1 LAST FOUR WEEKS HOW MUCH OF THE TIME DID YOUR ASTHMA KEEP YOU FROM GETTING AS MUCH DONE AT WORK, SCHOOL OR AT HOME: A LITTLE OF THE TIME

## 2024-01-22 ASSESSMENT — PATIENT HEALTH QUESTIONNAIRE - PHQ9
10. IF YOU CHECKED OFF ANY PROBLEMS, HOW DIFFICULT HAVE THESE PROBLEMS MADE IT FOR YOU TO DO YOUR WORK, TAKE CARE OF THINGS AT HOME, OR GET ALONG WITH OTHER PEOPLE: SOMEWHAT DIFFICULT
SUM OF ALL RESPONSES TO PHQ QUESTIONS 1-9: 10
SUM OF ALL RESPONSES TO PHQ QUESTIONS 1-9: 10

## 2024-01-22 NOTE — PROGRESS NOTES
Bean is a 38 year old who is being evaluated via a billable telephone visit.      What phone number would you like to be contacted at? Mobile  How would you like to obtain your AVS? O[place]t  Distant Location (provider location):  On-site    Assessment & Plan     Infection due to 2019 novel coronavirus  Patient is interested in antiviral treatment.  Prescription sent to the pharmacy.  Instructed to hold trazodone and Flovent while on this.  If needed she can use a half dose of trazodone (25 mg nightly).  - nirmatrelvir and ritonavir (PAXLOVID) 300 mg/100 mg therapy pack; Take 3 tablets by mouth 2 times daily (Take 2 Nirmatrelvir tablets and 1 Ritonavir tablet twice daily for 5 days)    Depression Screening Follow Up        1/22/2024    10:22 AM   PHQ   PHQ-9 Total Score 10   Q9: Thoughts of better off dead/self-harm past 2 weeks Not at all     Follow Up Actions Taken  Referred patient back to current mental health provider.  Referred patient back to PCP       Return if symptoms worsen or fail to improve.    Subjective   Bean is a 38 year old, presenting for the following health issues:  Covid Concern        1/22/2024    10:09 AM   Additional Questions   Roomed by MARKO Abbasi   Accompanied by self per phone call     HPI     COVID-19 Symptom Review  How many days ago did these symptoms start? X2 days     Are any of the following symptoms significant for you?  New or worsening difficulty breathing? Yes  Please describe what kind of difficulty you are having breathing:Moderate dyspnea (short of breath with ADLs, shortness of breath that limits the ability to walk up one flight of stairs without needing to rest)  Worsening cough? Yes, I am coughing up mucus.  Fever or chills? Yes, I felt feverish or had chills.  Headache: YES  Sore throat: No  Chest pain: No  Diarrhea: No  Body aches? YES    What treatments has patient tried? Decongestant - oral  Does patient live in a nursing home, group home, or shelter? YES  Does  patient have a way to get food/medications during quarantined? Yes, I have a friend or family member who can help me.        Objective           Vitals:  No vitals were obtained today due to virtual visit.    Physical Exam   General: Alert and no distress //Respiratory: No audible wheeze, cough, or shortness of breath // Psychiatric:  Appropriate affect, tone, and pace of words        Phone call duration: 5 minutes  Signed Electronically by: Maren Butterfield, DO

## 2024-01-22 NOTE — NURSING NOTE
"Chief Complaint   Patient presents with    Covid Concern     Patient has antiviral phone call for COVID today. She tested positive on Saturday.    Initial LMP 01/15/2024 (Exact Date)  Estimated body mass index is 62.44 kg/m  as calculated from the following:    Height as of 10/26/23: 1.651 m (5' 5\").    Weight as of 10/26/23: 170.2 kg (375 lb 3.2 oz).  Medication Review: complete    The next two questions are to help us understand your food security.  If you are feeling you need any assistance in this area, we have resources available to support you today.          1/22/2024   SDOH- Food Insecurity   Within the past 12 months, did you worry that your food would run out before you got money to buy more? N   Within the past 12 months, did the food you bought just not last and you didn t have money to get more? N         Health Care Directive:  Patient does not have a Health Care Directive or Living Will: Discussed advance care planning with patient; however, patient declined at this time.    Ayleen Sotelo LPN      "

## 2024-02-20 ENCOUNTER — MYC MEDICAL ADVICE (OUTPATIENT)
Dept: FAMILY MEDICINE | Facility: OTHER | Age: 39
End: 2024-02-20
Payer: COMMERCIAL

## 2024-02-20 DIAGNOSIS — K20.0 EOSINOPHILIC ESOPHAGITIS: Primary | ICD-10-CM

## 2024-02-29 ENCOUNTER — MYC REFILL (OUTPATIENT)
Dept: FAMILY MEDICINE | Facility: OTHER | Age: 39
End: 2024-02-29

## 2024-02-29 ENCOUNTER — E-VISIT (OUTPATIENT)
Dept: FAMILY MEDICINE | Facility: OTHER | Age: 39
End: 2024-02-29
Payer: COMMERCIAL

## 2024-02-29 ENCOUNTER — MYC MEDICAL ADVICE (OUTPATIENT)
Dept: FAMILY MEDICINE | Facility: OTHER | Age: 39
End: 2024-02-29

## 2024-02-29 DIAGNOSIS — F32.A ANXIETY AND DEPRESSION: ICD-10-CM

## 2024-02-29 DIAGNOSIS — F41.9 ANXIETY AND DEPRESSION: ICD-10-CM

## 2024-02-29 DIAGNOSIS — F32.A ANXIETY AND DEPRESSION: Primary | ICD-10-CM

## 2024-02-29 DIAGNOSIS — F41.9 ANXIETY AND DEPRESSION: Primary | ICD-10-CM

## 2024-02-29 PROCEDURE — 99421 OL DIG E/M SVC 5-10 MIN: CPT | Performed by: NURSE PRACTITIONER

## 2024-02-29 RX ORDER — SERTRALINE HYDROCHLORIDE 100 MG/1
100 TABLET, FILM COATED ORAL DAILY
Qty: 90 TABLET | Refills: 0 | Status: CANCELLED | OUTPATIENT
Start: 2024-02-29

## 2024-02-29 RX ORDER — SERTRALINE HYDROCHLORIDE 100 MG/1
100 TABLET, FILM COATED ORAL DAILY
Qty: 30 TABLET | Refills: 0 | Status: SHIPPED | OUTPATIENT
Start: 2024-02-29 | End: 2024-03-19

## 2024-02-29 ASSESSMENT — ANXIETY QUESTIONNAIRES
7. FEELING AFRAID AS IF SOMETHING AWFUL MIGHT HAPPEN: SEVERAL DAYS
GAD7 TOTAL SCORE: 4
8. IF YOU CHECKED OFF ANY PROBLEMS, HOW DIFFICULT HAVE THESE MADE IT FOR YOU TO DO YOUR WORK, TAKE CARE OF THINGS AT HOME, OR GET ALONG WITH OTHER PEOPLE?: SOMEWHAT DIFFICULT
GAD7 TOTAL SCORE: 4

## 2024-02-29 ASSESSMENT — PATIENT HEALTH QUESTIONNAIRE - PHQ9
SUM OF ALL RESPONSES TO PHQ QUESTIONS 1-9: 5
10. IF YOU CHECKED OFF ANY PROBLEMS, HOW DIFFICULT HAVE THESE PROBLEMS MADE IT FOR YOU TO DO YOUR WORK, TAKE CARE OF THINGS AT HOME, OR GET ALONG WITH OTHER PEOPLE: SOMEWHAT DIFFICULT

## 2024-02-29 NOTE — PATIENT INSTRUCTIONS
My Depression Action Plan  Name: Bean Garcia   Date of Birth 1985  Date: 2/29/2024    My doctor: Helen Mccord   My clinic: [facility]  [address]  [place] [id number] [phone]          GREEN    ZONE   Good Control    What it looks like:   Things are going generally well. You have normal ups and downs. You may even feel depressed from time to time, but bad moods usually last less than a day.   What you need to do:  Continue to care for yourself (see self care plan)  Check your depression survival kit and update it as needed  Follow your physician s recommendations including any medication.  Do not stop taking medication unless you consult with your physician first.           YELLOW         ZONE Getting Worse    What it looks like:   Depression is starting to interfere with your life.   It may be hard to get out of bed; you may be starting to isolate yourself from others.  Symptoms of depression are starting to last most all day and this has happened for several days.   You may have suicidal thoughts but they are not constant.   What you need to do:     Call your care team. Your response to treatment will improve if you keep your care team informed of your progress. Yellow periods are signs an adjustment may need to be made.     Continue your self-care.  Just get dressed and ready for the day.  Don't give yourself time to talk yourself out of it.    Talk to someone in your support network.    Open up your Depression Self-Care Plan/Wellness Kit.           RED    ZONE Medical Alert - Get Help    What it looks like:   Depression is seriously interfering with your life.   You may experience these or other symptoms: You can t get out of bed most days, can t work or engage in other necessary activities, you have trouble taking care of basic hygiene, or basic responsibilities, thoughts of suicide or death that will not go away, self-injurious  behavior.     What you need to do:  Call your care team and request a same-day appointment. If they are not available (weekends or after hours) call your local crisis line, emergency room or 911.          Depression Self-Care Plan / Wellness Kit    Many people find that medication and therapy are helpful treatments for managing depression. In addition, making small changes to your everyday life can help to boost your mood and improve your wellbeing. Below are some tips for you to consider. Be sure to talk with your medical provider and/or behavioral health consultant if your symptoms are worsening or not improving.     Sleep   Sleep hygiene  means all of the habits that support good, restful sleep. It includes maintaining a consistent bedtime and wake time, using your bedroom only for sleeping or sex, and keeping the bedroom dark and free of distractions like a computer, smartphone, or television.     Develop a Healthy Routine  Maintain good hygiene. Get out of bed in the morning, make your bed, brush your teeth, take a shower, and get dressed. Don t spend too much time viewing media that makes you feel stressed. Find time to relax each day.    Exercise  Get some form of exercise every day. This will help reduce pain and release endorphins, the  feel good  chemicals in your brain. It can be as simple as just going for a walk or doing some gardening, anything that will get you moving.      Diet  Strive to eat healthy foods, including fruits and vegetables. Drink plenty of water. Avoid excessive sugar, caffeine, alcohol, and other mood-altering substances.     Stay Connected with Others  Stay in touch with friends and family members.    Manage Your Mood  Try deep breathing, massage therapy, biofeedback, or meditation. Take part in fun activities when you can. Try to find something to smile about each day.     Psychotherapy  Be open to working with a therapist if your provider recommends it.     Medication  Be sure to  take your medication as prescribed. Most anti-depressants need to be taken every day. It usually takes several weeks for medications to work. Not all medicines work for all people. It is important to follow-up with your provider to make sure you have a treatment plan that is working for you. Do not stop your medication abruptly without first discussing it with your provider.    Crisis Resources   These hotlines are for both adults and children. They and are open 24 hours a day, 7 days a week unless noted otherwise.    National Suicide Prevention Lifeline   988 or 1-187-211-LWYZ (3851)    Crisis Text Line    www.crisistextline.org  Text HOME to 398093 from anywhere in the [place], anytime, about any type of crisis. A live, trained crisis counselor will receive the text and respond quickly.    [employer] Lifeline for LGBTQ Youth  A national crisis intervention and suicide lifeline for LGBTQ youth under 25. Provides a safe place to talk without judgement. Call 1-973.521.3612; text START to 364011 or visit www.thetrevorproject.org to talk to a trained counselor.    For [facility] crisis numbers, visit the [facility] website at:  https://[place].gov/dhs/people-we-serve/adults/health-care/mental-health/resources/crisis-contacts.jsp

## 2024-02-29 NOTE — TELEPHONE ENCOUNTER
Provider E-Visit time total (minutes): 5    Patient had requested a refill of medication via an e-visit. Medications (sertraline) was refilled for 1 month. She has an upcoming appointment in 3 weeks. She is stable at this time.     Soumya Whittington NP on 2/29/2024 at 11:16 AM

## 2024-02-29 NOTE — TELEPHONE ENCOUNTER
Patient states the e-visit she requested was just for a refill on Sertraline to get her by until her appointment on 3/19/24. Refill pending.     Soumya Downing CMA on 2/29/2024 at 9:08 AM

## 2024-02-29 NOTE — PROGRESS NOTES
I gave patient a call and left message for her to call unit 4 nurse back. I am uncertain on what the goal of this e-visit is. Does she need medication refills? Looks like she is stable and does have an upcoming appointment with [facility] in 3 weeks. Please see what she needs, thank you.     I could potentially see her in office today actually, if she is interested you could offer her an appointment.     Sincerely,   Soumya Whittington NP on 2/29/2024 at 8:29 AM

## 2024-02-29 NOTE — TELEPHONE ENCOUNTER
See Evisit today for Soumya Whittington for Depression:        Phoebe Oleary RN on 2/29/2024 at 9:29 AM

## 2024-03-01 ASSESSMENT — ANXIETY QUESTIONNAIRES: GAD7 TOTAL SCORE: 4

## 2024-03-01 ASSESSMENT — PATIENT HEALTH QUESTIONNAIRE - PHQ9: SUM OF ALL RESPONSES TO PHQ QUESTIONS 1-9: 5

## 2024-03-13 ENCOUNTER — MYC MEDICAL ADVICE (OUTPATIENT)
Dept: FAMILY MEDICINE | Facility: OTHER | Age: 39
End: 2024-03-13
Payer: COMMERCIAL

## 2024-03-13 ASSESSMENT — ASTHMA QUESTIONNAIRES
ACT_TOTALSCORE: 16
QUESTION_5 LAST FOUR WEEKS HOW WOULD YOU RATE YOUR ASTHMA CONTROL: SOMEWHAT CONTROLLED
ACT_TOTALSCORE: 13
QUESTION_5 LAST FOUR WEEKS HOW WOULD YOU RATE YOUR ASTHMA CONTROL: SOMEWHAT CONTROLLED
QUESTION_1 LAST FOUR WEEKS HOW MUCH OF THE TIME DID YOUR ASTHMA KEEP YOU FROM GETTING AS MUCH DONE AT WORK, SCHOOL OR AT HOME: SOME OF THE TIME
QUESTION_3 LAST FOUR WEEKS HOW OFTEN DID YOUR ASTHMA SYMPTOMS (WHEEZING, COUGHING, SHORTNESS OF BREATH, CHEST TIGHTNESS OR PAIN) WAKE YOU UP AT NIGHT OR EARLIER THAN USUAL IN THE MORNING: ONCE A WEEK
QUESTION_4 LAST FOUR WEEKS HOW OFTEN HAVE YOU USED YOUR RESCUE INHALER OR NEBULIZER MEDICATION (SUCH AS ALBUTEROL): ONCE A WEEK OR LESS
QUESTION_4 LAST FOUR WEEKS HOW OFTEN HAVE YOU USED YOUR RESCUE INHALER OR NEBULIZER MEDICATION (SUCH AS ALBUTEROL): ONCE A WEEK OR LESS
QUESTION_1 LAST FOUR WEEKS HOW MUCH OF THE TIME DID YOUR ASTHMA KEEP YOU FROM GETTING AS MUCH DONE AT WORK, SCHOOL OR AT HOME: SOME OF THE TIME
ACT_TOTALSCORE: 13
QUESTION_3 LAST FOUR WEEKS HOW OFTEN DID YOUR ASTHMA SYMPTOMS (WHEEZING, COUGHING, SHORTNESS OF BREATH, CHEST TIGHTNESS OR PAIN) WAKE YOU UP AT NIGHT OR EARLIER THAN USUAL IN THE MORNING: TWO OR THREE NIGHTS A WEEK
ACT_TOTALSCORE: 16
QUESTION_2 LAST FOUR WEEKS HOW OFTEN HAVE YOU HAD SHORTNESS OF BREATH: THREE TO SIX TIMES A WEEK
QUESTION_2 LAST FOUR WEEKS HOW OFTEN HAVE YOU HAD SHORTNESS OF BREATH: MORE THAN ONCE A DAY

## 2024-03-13 ASSESSMENT — ANXIETY QUESTIONNAIRES
GAD7 TOTAL SCORE: 5
GAD7 TOTAL SCORE: 5
2. NOT BEING ABLE TO STOP OR CONTROL WORRYING: NOT AT ALL
8. IF YOU CHECKED OFF ANY PROBLEMS, HOW DIFFICULT HAVE THESE MADE IT FOR YOU TO DO YOUR WORK, TAKE CARE OF THINGS AT HOME, OR GET ALONG WITH OTHER PEOPLE?: SOMEWHAT DIFFICULT
7. FEELING AFRAID AS IF SOMETHING AWFUL MIGHT HAPPEN: NOT AT ALL
1. FEELING NERVOUS, ANXIOUS, OR ON EDGE: SEVERAL DAYS
5. BEING SO RESTLESS THAT IT IS HARD TO SIT STILL: NOT AT ALL
4. TROUBLE RELAXING: SEVERAL DAYS
6. BECOMING EASILY ANNOYED OR IRRITABLE: MORE THAN HALF THE DAYS
7. FEELING AFRAID AS IF SOMETHING AWFUL MIGHT HAPPEN: NOT AT ALL
IF YOU CHECKED OFF ANY PROBLEMS ON THIS QUESTIONNAIRE, HOW DIFFICULT HAVE THESE PROBLEMS MADE IT FOR YOU TO DO YOUR WORK, TAKE CARE OF THINGS AT HOME, OR GET ALONG WITH OTHER PEOPLE: SOMEWHAT DIFFICULT
3. WORRYING TOO MUCH ABOUT DIFFERENT THINGS: SEVERAL DAYS

## 2024-03-18 ASSESSMENT — PATIENT HEALTH QUESTIONNAIRE - PHQ9
SUM OF ALL RESPONSES TO PHQ QUESTIONS 1-9: 7
SUM OF ALL RESPONSES TO PHQ QUESTIONS 1-9: 7
10. IF YOU CHECKED OFF ANY PROBLEMS, HOW DIFFICULT HAVE THESE PROBLEMS MADE IT FOR YOU TO DO YOUR WORK, TAKE CARE OF THINGS AT HOME, OR GET ALONG WITH OTHER PEOPLE: SOMEWHAT DIFFICULT

## 2024-03-19 ENCOUNTER — OFFICE VISIT (OUTPATIENT)
Dept: FAMILY MEDICINE | Facility: OTHER | Age: 39
End: 2024-03-19
Attending: FAMILY MEDICINE
Payer: COMMERCIAL

## 2024-03-19 VITALS
SYSTOLIC BLOOD PRESSURE: 145 MMHG | BODY MASS INDEX: 48.82 KG/M2 | RESPIRATION RATE: 20 BRPM | HEIGHT: 65 IN | HEART RATE: 80 BPM | DIASTOLIC BLOOD PRESSURE: 81 MMHG | WEIGHT: 293 LBS | OXYGEN SATURATION: 96 % | TEMPERATURE: 98.5 F

## 2024-03-19 DIAGNOSIS — J45.20 MILD INTERMITTENT ASTHMA WITHOUT COMPLICATION: ICD-10-CM

## 2024-03-19 DIAGNOSIS — M54.50 LOW BACK PAIN, UNSPECIFIED BACK PAIN LATERALITY, UNSPECIFIED CHRONICITY, UNSPECIFIED WHETHER SCIATICA PRESENT: ICD-10-CM

## 2024-03-19 DIAGNOSIS — F41.9 ANXIETY AND DEPRESSION: Primary | ICD-10-CM

## 2024-03-19 DIAGNOSIS — F32.A ANXIETY AND DEPRESSION: Primary | ICD-10-CM

## 2024-03-19 PROCEDURE — 90471 IMMUNIZATION ADMIN: CPT | Performed by: FAMILY MEDICINE

## 2024-03-19 PROCEDURE — 90651 9VHPV VACCINE 2/3 DOSE IM: CPT | Performed by: FAMILY MEDICINE

## 2024-03-19 PROCEDURE — 90480 ADMN SARSCOV2 VAC 1/ONLY CMP: CPT | Performed by: FAMILY MEDICINE

## 2024-03-19 PROCEDURE — 90472 IMMUNIZATION ADMIN EACH ADD: CPT | Performed by: FAMILY MEDICINE

## 2024-03-19 PROCEDURE — 90677 PCV20 VACCINE IM: CPT | Performed by: FAMILY MEDICINE

## 2024-03-19 PROCEDURE — 91320 SARSCV2 VAC 30MCG TRS-SUC IM: CPT | Performed by: FAMILY MEDICINE

## 2024-03-19 PROCEDURE — 99214 OFFICE O/P EST MOD 30 MIN: CPT | Mod: 25 | Performed by: FAMILY MEDICINE

## 2024-03-19 PROCEDURE — 90686 IIV4 VACC NO PRSV 0.5 ML IM: CPT | Performed by: FAMILY MEDICINE

## 2024-03-19 RX ORDER — SERTRALINE HYDROCHLORIDE 100 MG/1
100 TABLET, FILM COATED ORAL DAILY
Qty: 90 TABLET | Refills: 3 | Status: SHIPPED | OUTPATIENT
Start: 2024-03-19

## 2024-03-19 RX ORDER — MONTELUKAST SODIUM 10 MG/1
10 TABLET ORAL AT BEDTIME
Qty: 90 TABLET | Refills: 3 | Status: SHIPPED | OUTPATIENT
Start: 2024-03-19

## 2024-03-19 ASSESSMENT — PAIN SCALES - GENERAL: PAINLEVEL: NO PAIN (0)

## 2024-03-19 NOTE — PROGRESS NOTES
"Nursing Notes:   Mireya Alvina MENDENHALL  3/19/2024 10:48 AM  Sign at exiting of workspace  Chief Complaint   Patient presents with    Recheck Medication       Initial BP (!) 145/81 (BP Location: Right arm, Patient Position: Sitting, Cuff Size: Adult Large)   Pulse 80   Temp 98.5  F (36.9  C) (Tympanic)   Resp 20   Ht 1.651 m (5' 5\")   Wt (!) 167.8 kg (370 lb)   LMP 02/27/2024 (Exact Date)   SpO2 96%   BMI 61.57 kg/m   Estimated body mass index is 61.57 kg/m  as calculated from the following:    Height as of this encounter: 1.651 m (5' 5\").    Weight as of this encounter: 167.8 kg (370 lb).  Medication Review: complete    The next two questions are to help us understand your food security.  If you are feeling you need any assistance in this area, we have resources available to support you today.          1/22/2024   SDOH- Food Insecurity   Within the past 12 months, did you worry that your food would run out before you got money to buy more? N   Within the past 12 months, did the food you bought just not last and you didn t have money to get more? N           Alvina Damonpaula Del Angel is a 38 year old, presenting for the following health issues:  Recheck Medication        1/22/2024    10:09 AM   Additional Questions   Roomed by MARKO Abbasi   Accompanied by self per phone call     Feels like her anxiety and depression are fairly well controlled.  She is seeing a counseling virtually through the [employer] [facility].  She is doing well with that.  No suicidal ideation.    Asthma is not as well controlled as it should be.  She is using flovent 110 mcg 1 puff twice daily.    Has had a lot of low back pain over her lumbar spine.    ASTHMA HISTORY    Frequency of daytime symptoms:  3-4 days a week.  Frequency of nighttime symptoms:  in the past couple of weeks has woke 2-3 days a week with wheezing/cough.    How many albuterol puffs per week: in the past couple of weeks has used it once a day.  How " often are you missing out on activities:  not missing any.    Triggers:  allergies, strong scents, upper respiratory infection  How do you try to avoid triggers: tries to avoid fumes.  Medications:  flovent and albuterol.     Inhaler technique:  good    Current control:  not ideally controlled.  Barriers to treatment:  none  ER visits/Hospitalizations in past year?  none  Recent need for steroid treatment?  none  Asthma Action Plan up to date?  Yes, completed today.  Tobacco use?   no  Interested in cessation?  N/a    Has appointment for allergy testing next week.    Has had some back pain when she stands or walks too long.  No bowel/bladder dysfunction or saddle anesthesia.  No foot drop.      History of Present Illness     Asthma:  She presents for follow up of asthma.  She has no cough, no wheezing, and no shortness of breath.  She is using a relief medication a few times a week. She does not have a controller medication. Patient is aware of the following triggers: animal dander, cold air, dust mites, pollens, smoke and strong odors and fumes. The patient has had a visit to the Emergency Room, Urgent Care or Hospital due to asthma since the last clinic visit. She has been to the Emergency Room or Urgent Care 0 times.She has had a Hospitalization 0 times.    Back Pain:  She presents for follow up of back pain. Patient's back pain is a chronic problem.  Location of back pain:  Right lower back and left lower back  Description of back pain: burning, sharp, shooting and stabbing  Back pain spreads: right buttocks, left buttocks, right thigh, left thigh, right shoulder, left shoulder, right side of neck and left side of neck    Since patient first noticed back pain, pain is: gradually worsening  Does back pain interfere with her job:  Yes       Mental Health Follow-up:  Patient presents to follow-up on Depression & Anxiety.Patient's depression since last visit has been:  Medium  The patient is having other symptoms  "associated with depression.  Patient's anxiety since last visit has been:  Medium  The patient is having other symptoms associated with anxiety.  Any significant life events: health concerns  Patient is feeling anxious or having panic attacks.  Patient has no concerns about alcohol or drug use.    She eats 2-3 servings of fruits and vegetables daily.She consumes 1 sweetened beverage(s) daily.She exercises with enough effort to increase her heart rate 10 to 19 minutes per day.  She exercises with enough effort to increase her heart rate 3 or less days per week. She is missing 1 dose(s) of medications per week.  She is not taking prescribed medications regularly due to remembering to take.         11/29/2023     2:31 PM 2/29/2024     6:46 AM 3/13/2024     7:15 PM   LYNDA-7 SCORE   Total Score 8 (mild anxiety) 4 (minimal anxiety) 5 (mild anxiety)   Total Score 8 4 5           1/22/2024    10:22 AM 2/29/2024     6:46 AM 3/18/2024     5:17 PM   PHQ   PHQ-9 Total Score 10 5 7   Q9: Thoughts of better off dead/self-harm past 2 weeks Not at all Not at all Not at all         1/22/2024    10:24 AM 3/13/2024     1:34 PM 3/13/2024     7:18 PM   ACT Total Scores   ACT TOTAL SCORE (Goal Greater than or Equal to 20) 16 16 13   In the past 12 months, how many times did you visit the emergency room for your asthma without being admitted to the hospital? 0 0 0   In the past 12 months, how many times were you hospitalized overnight because of your asthma? 0 0 0                   Review of Systems  Constitutional, HEENT, cardiovascular, pulmonary, GI, , musculoskeletal, neuro, skin, endocrine and psych systems are negative, except as otherwise noted.      Objective    BP (!) 145/81 (BP Location: Right arm, Patient Position: Sitting, Cuff Size: Adult Large)   Pulse 80   Temp 98.5  F (36.9  C) (Tympanic)   Resp 20   Ht 1.651 m (5' 5\")   Wt (!) 167.8 kg (370 lb)   LMP 02/27/2024 (Exact Date)   SpO2 96%   BMI 61.57 kg/m    Body " mass index is 61.57 kg/m .  Physical Exam  Constitutional:       Appearance: Normal appearance.   HENT:      Head: Normocephalic.   Eyes:      Extraocular Movements: Extraocular movements intact.      Pupils: Pupils are equal, round, and reactive to light.   Cardiovascular:      Rate and Rhythm: Normal rate and regular rhythm.      Heart sounds: Normal heart sounds. No murmur heard.  Pulmonary:      Effort: Pulmonary effort is normal.      Breath sounds: Normal breath sounds. No wheezing, rhonchi or rales.   Musculoskeletal:      Cervical back: Normal range of motion and neck supple.      Comments: No spinous process tenderness in the lumbar spine.     Lymphadenopathy:      Cervical: No cervical adenopathy.   Neurological:      Mental Status: She is alert.              ICD-10-CM    1. Anxiety and depression  F41.9 sertraline (ZOLOFT) 100 MG tablet    F32.A       2. Mild intermittent asthma without complication  J45.20 montelukast (SINGULAIR) 10 MG tablet      3. Low back pain, unspecified back pain laterality, unspecified chronicity, unspecified whether sciatica present  M54.50 Physical Therapy  Referral           She feels this is stable and needs a refill of her zoloft.  This is completed today.  Follow up as needed.  Will add singulair 10 mg at bedtime.  She feels that her allergies are a big  of her asthma symptoms.  She does have an upcoming appointment to see an allergist and have allergy testing completed.  She will start this after that appointment.  If not improving symptoms, could consider trying increased dose of flovent, switching to a combined inhaled steroid/longacting bronchodilator such as advair.  If ultimately not improving, could also consider referral to Pulmonology for consideration of treatment of allergic asthma with a medication like xolair.  Referred for Physical Therapy.  Follow up as needed.  Consider imaging if needed.    Return in about 1 month (around 4/19/2024) for  Follow up visit - asthma.         Helen Mccord MD

## 2024-03-19 NOTE — NURSING NOTE
"Chief Complaint   Patient presents with    Recheck Medication       Initial BP (!) 145/81 (BP Location: Right arm, Patient Position: Sitting, Cuff Size: Adult Large)   Pulse 80   Temp 98.5  F (36.9  C) (Tympanic)   Resp 20   Ht 1.651 m (5' 5\")   Wt (!) 167.8 kg (370 lb)   LMP 02/27/2024 (Exact Date)   SpO2 96%   BMI 61.57 kg/m   Estimated body mass index is 61.57 kg/m  as calculated from the following:    Height as of this encounter: 1.651 m (5' 5\").    Weight as of this encounter: 167.8 kg (370 lb).  Medication Review: complete    The next two questions are to help us understand your food security.  If you are feeling you need any assistance in this area, we have resources available to support you today.          1/22/2024   SDOH- Food Insecurity   Within the past 12 months, did you worry that your food would run out before you got money to buy more? N   Within the past 12 months, did the food you bought just not last and you didn t have money to get more? N           Alvina Gomes      "

## 2024-03-19 NOTE — LETTER
My Asthma Action Plan    Name: Bean Garcia   YOB: 1985  Date: 3/19/2024   My doctor: Helen Mccord MD   My clinic: [facility] in [place]        My Control Medicine: Fluticasone propionate (Flovent HFA) - 110 mcg 1 puff twice daily   My Rescue Medicine: Albuterol (Proair/Ventolin/Proventil HFA) 2-4 puffs EVERY 4 HOURS as needed. Use a spacer if recommended by your provider.   My Asthma Severity:   Mild Persistent  Know your asthma triggers: upper respiratory infections, strong odors and fumes, and upper respiratory infections               GREEN ZONE   Good Control  I feel good  No cough or wheeze  Can work, sleep and play without asthma symptoms       Take your asthma control medicine every day.     If exercise triggers your asthma, take your rescue medication  15 minutes before exercise or sports, and  During exercise if you have asthma symptoms  Spacer to use with inhaler: If you have a spacer, make sure to use it with your inhaler             YELLOW ZONE Getting Worse  I have ANY of these:  I do not feel good  Cough or wheeze  Chest feels tight  Wake up at night   Keep taking your Green Zone medications  Start taking your rescue medicine:  every 20 minutes for up to 1 hour. Then every 4 hours for 24-48 hours.  If you stay in the Yellow Zone for more than 12-24 hours, contact your doctor.  If you do not return to the Green Zone in 12-24 hours or you get worse, start taking your oral steroid medicine if prescribed by your provider.           RED ZONE Medical Alert - Get Help  I have ANY of these:  I feel awful  Medicine is not helping  Breathing getting harder  Trouble walking or talking  Nose opens wide to breathe       Take your rescue medicine NOW  If your provider has prescribed an oral steroid medicine, start taking it NOW  Call your doctor NOW  If you are still in the Red Zone after 20 minutes and you have not reached your doctor:  Take your rescue medicine again  and  Call 911 or go to the emergency room right away    See your regular doctor within 2 weeks of an Emergency Room or Urgent Care visit for follow-up treatment.          Annual Reminders:  Meet with Asthma Educator,  Flu Shot in the Fall, consider Pneumonia Vaccination for patients with asthma (aged 19 and older).    Pharmacy: [facility] - [place] [id number]    Electronically signed by Helen Mccord MD   Date: 03/19/24                      Asthma Triggers  How To Control Things That Make Your Asthma Worse    Triggers are things that make your asthma worse.  Look at the list below to help you find your triggers and what you can do about them.  You can help prevent asthma flare-ups by staying away from your triggers.      Trigger                                                          What you can do   Cigarette Smoke  Tobacco smoke can make asthma worse. Do not allow smoking in your home, car or around you.  Be sure no one smokes at a child s day care or school.  If you smoke, ask your health care provider for ways to help you quit.  Ask family members to quit too.  Ask your health care provider for a referral to Quit Plan to help you quit smoking, or call 0-101-292-PLAN.     Colds, Flu, Bronchitis  These are common triggers of asthma. Wash your hands often.  Don t touch your eyes, nose or mouth.  Get a flu shot every year.     Dust Mites  These are tiny bugs that live in cloth or carpet. They are too small to see. Wash sheets and blankets in hot water every week.   Encase pillows and mattress in dust mite proof covers.  Avoid having carpet if you can. If you have carpet, vacuum weekly.   Use a dust mask and HEPA vacuum.   Pollen and Outdoor Mold  Some people are allergic to trees, grass, or weed pollen, or molds. Try to keep your windows closed.  Limit time out doors when pollen count is high.   Ask you health care provider about taking medicine during allergy season.      Animal Dander  Some people are allergic to skin flakes, urine or saliva from pets with fur or feathers. Keep pets with fur or feathers out of your home.    If you can t keep the pet outdoors, then keep the pet out of your bedroom.  Keep the bedroom door closed.  Keep pets off cloth furniture and away from stuffed toys.     Mice, Rats, and Cockroaches   Some people are allergic to the waste from these pests.   Cover food and garbage.  Clean up spills and food crumbs.  Store grease in the refrigerator.   Keep food out of the bedroom.   Indoor Mold  This can be a trigger if your home has high moisture. Fix leaking faucets, pipes, or other sources of water.   Clean moldy surfaces.  Dehumidify basement if it is damp and smelly.   Smoke, Strong Odors, and Sprays  These can reduce air quality. Stay away from strong odors and sprays, such as perfume, powder, hair spray, paints, smoke incense, paint, cleaning products, candles and new carpet.   Exercise or Sports  Some people with asthma have this trigger. Be active!  Ask your doctor about taking medicine before sports or exercise to prevent symptoms.    Warm up for 5-10 minutes before and after sports or exercise.     Other Triggers of Asthma  Cold air:  Cover your nose and mouth with a scarf.  Sometimes laughing or crying can be a trigger.  Some medicines and food can trigger asthma.

## 2024-03-20 ENCOUNTER — MYC MEDICAL ADVICE (OUTPATIENT)
Dept: FAMILY MEDICINE | Facility: OTHER | Age: 39
End: 2024-03-20
Payer: COMMERCIAL

## 2024-03-20 DIAGNOSIS — F41.9 ANXIETY AND DEPRESSION: Primary | ICD-10-CM

## 2024-03-20 DIAGNOSIS — F32.A ANXIETY AND DEPRESSION: Primary | ICD-10-CM

## 2024-04-27 ENCOUNTER — MYC MEDICAL ADVICE (OUTPATIENT)
Dept: FAMILY MEDICINE | Facility: OTHER | Age: 39
End: 2024-04-27
Payer: COMMERCIAL

## 2024-05-30 ENCOUNTER — MYC REFILL (OUTPATIENT)
Dept: FAMILY MEDICINE | Facility: OTHER | Age: 39
End: 2024-05-30
Payer: COMMERCIAL

## 2024-05-30 ENCOUNTER — TELEPHONE (OUTPATIENT)
Dept: FAMILY MEDICINE | Facility: OTHER | Age: 39
End: 2024-05-30
Payer: COMMERCIAL

## 2024-05-30 DIAGNOSIS — K21.00 GASTROESOPHAGEAL REFLUX DISEASE WITH ESOPHAGITIS WITHOUT HEMORRHAGE: ICD-10-CM

## 2024-05-30 DIAGNOSIS — K20.0 EOSINOPHILIC ESOPHAGITIS: Primary | ICD-10-CM

## 2024-05-30 DIAGNOSIS — F41.9 ANXIETY AND DEPRESSION: ICD-10-CM

## 2024-05-30 DIAGNOSIS — F51.02 ADJUSTMENT INSOMNIA: ICD-10-CM

## 2024-05-30 DIAGNOSIS — J45.20 MILD INTERMITTENT ASTHMA WITHOUT COMPLICATION: ICD-10-CM

## 2024-05-30 DIAGNOSIS — J45.20 MILD INTERMITTENT ASTHMA WITHOUT COMPLICATION: Primary | ICD-10-CM

## 2024-05-30 DIAGNOSIS — F32.A ANXIETY AND DEPRESSION: ICD-10-CM

## 2024-05-30 RX ORDER — ALBUTEROL SULFATE 90 UG/1
2 AEROSOL, METERED RESPIRATORY (INHALATION) EVERY 6 HOURS PRN
Qty: 6.7 G | Refills: 8 | Status: SHIPPED | OUTPATIENT
Start: 2024-05-30

## 2024-05-30 NOTE — TELEPHONE ENCOUNTER
Pt's insurance now prefers generic Proventil instead of generic ProAir. Sending new Rx for remaining qty on previous Rx per CPA.     Daniel Francois, PharmD  [facility]

## 2024-06-01 RX ORDER — ALBUTEROL SULFATE 90 UG/1
2 AEROSOL, METERED RESPIRATORY (INHALATION) EVERY 6 HOURS PRN
Qty: 18 G | Refills: 11 | OUTPATIENT
Start: 2024-06-01

## 2024-06-01 RX ORDER — FLUTICASONE PROPIONATE 110 UG/1
1 AEROSOL, METERED RESPIRATORY (INHALATION) 2 TIMES DAILY
Qty: 12 G | Refills: 11 | OUTPATIENT
Start: 2024-06-01

## 2024-06-01 RX ORDER — MONTELUKAST SODIUM 10 MG/1
10 TABLET ORAL AT BEDTIME
Qty: 90 TABLET | Refills: 3 | OUTPATIENT
Start: 2024-06-01

## 2024-06-01 RX ORDER — TRAZODONE HYDROCHLORIDE 50 MG/1
50 TABLET, FILM COATED ORAL AT BEDTIME
Qty: 90 TABLET | Refills: 3 | OUTPATIENT
Start: 2024-06-01

## 2024-06-01 RX ORDER — SERTRALINE HYDROCHLORIDE 100 MG/1
100 TABLET, FILM COATED ORAL DAILY
Qty: 90 TABLET | Refills: 3 | OUTPATIENT
Start: 2024-06-01

## 2024-06-01 NOTE — TELEPHONE ENCOUNTER
Patient sent Rx request for the following:      Requested Prescriptions   Pending Prescriptions Disp Refills    sertraline (ZOLOFT) 50 MG tablet 90 tablet 3     Sig: Take 1 tablet (50 mg) by mouth daily       SSRIs Protocol Failed - 6/1/2024 11:08 AM   Last Prescription Date:   3/21/24  Last Fill Qty/Refills:         90, R-3    Last Office Visit:              3/19/24   Future Office visit:             Next 5 appointments (look out 90 days)      Jun 04, 2024  8:20 AM  (Arrive by 8:05 AM)  SHORT with Helen Mccord MD  [facility] ([facility] ) [address]  [place] [address] [phone]           Pt to contact pharmacy for refills. One year sent on 3/21/24.  Jenni Wu RN on 6/1/2024 at 11:08 AM

## 2024-06-01 NOTE — TELEPHONE ENCOUNTER
Multiple medication have refills available. Updated patient to contact her pharmacy for refills of medications as mae does not communicate with her pharmacy.   Seema Pike RN  ....................  6/1/2024   12:07 PM    Requested Prescriptions   Pending Prescriptions Disp Refills    omeprazole (PRILOSEC) 20 MG DR capsule 90 capsule 3     Sig: Take 1 capsule (20 mg) by mouth daily       PPI Protocol Failed - 5/30/2024  9:23 AM        Failed - Medication indicated for associated diagnosis     The medication is prescribed for one or more of the following conditions:     Erosive esophagitis    Gastritis   Gastric hypersecretion   Gastric ulcer   Gastroesophageal reflux disease   Helicobacter pylori gastrointestinal tract infection   Ulcer of duodenum   Drug-induced peptic ulcer   Esophageal stricture   Gastrointestinal hemorrhage   Indigestion   Stress ulcer   Zollinger-Garcia syndrome   Yun s esophagus   Laryngopharyngeal reflux          Passed - Medication is active on med list        Passed - Recent (12 mo) or future (90 days) visit within the authorizing provider's specialty     The patient must have completed an in-person or virtual visit within the past 12 months or has a future visit scheduled within the next 90 days with the authorizing provider s specialty.  Urgent care and e-visits do not quality as an office visit for this protocol.          Passed - Patient is age 18 or older        Passed - No active pregnacy on record        Passed - No positive pregnancy test in past 12 months         Refused Prescriptions Disp Refills    albuterol (PROAIR HFA/PROVENTIL HFA/VENTOLIN HFA) 108 (90 Base) MCG/ACT inhaler 18 g 11     Sig: Inhale 2 puffs into the lungs every 6 hours as needed for shortness of breath or wheezing       Short-Acting Beta Agonist Inhalers Protocol  Failed - 5/30/2024  9:23 AM        Failed - Asthma control assessment score within normal limits in last 6 months     Please review ACT  "score.           Passed - Patient is age 12 or older        Passed - Medication is active on med list        Passed - Recent (6 mo) or future (30 days) visit within the authorizing provider's specialty     Patient had office visit in the last 6 months or has a visit in the next 30 days with authorizing provider or within the authorizing provider's specialty.  See \"Patient Info\" tab in inbasket, or \"Choose Columns\" in Meds & Orders section of the refill encounter.              fluticasone (FLOVENT HFA) 110 MCG/ACT inhaler 12 g 11     Sig: Inhale 1 puff into the lungs 2 times daily       Inhaled Steroids Protocol Failed - 5/30/2024  9:23 AM        Failed - Asthma control assessment score within normal limits in last 6 months     Please review ACT score.           Passed - Patient is age 12 or older        Passed - Medication is active on med list        Passed - Recent (6 mo) or future (90 days) visit within the authorizing provider's specialty     The patient must have completed an in-person or virtual visit within the past 6 months or has a future visit scheduled within the next 90 days with the authorizing provider s specialty.  Urgent care and e-visits do not quality as an office visit for this protocol.          Passed - Medication indicated for associated diagnosis     Medication is associated with one or more of the following diagnoses:    Allergies   Asthma   COPD   Nasal Congestion   Nasal Polyps   Rhinitis            traZODone (DESYREL) 50 MG tablet 90 tablet 3     Sig: Take 1 tablet (50 mg) by mouth at bedtime       Serotonin Modulators Passed - 5/30/2024  9:23 AM        Passed - Recent (12 mo) or future (30 days) visit within the authorizing provider's specialty     The patient must have completed an in-person or virtual visit within the past 12 months or has a future visit scheduled within the next 90 days with the authorizing provider s specialty.  Urgent care and e-visits do not quality as an office " visit for this protocol.          Passed - Medication is active on med list        Passed - Patient is age 18 or older        Passed - No active pregnancy on record        Passed - No positive pregnancy test in past 12 months          montelukast (SINGULAIR) 10 MG tablet 90 tablet 3     Sig: Take 1 tablet (10 mg) by mouth at bedtime       Leukotriene Inhibitors Protocol Failed - 5/30/2024  9:23 AM        Failed - Asthma control assessment score within normal limits in last 6 months     Please review ACT score.           Passed - Patient is age 12 or older     If patient is under 16, ok to refill using age based dosing.           Passed - Medication is active on med list        Passed - Recent (6 mo) or future (90 days) visit within the authorizing provider's specialty     The patient must have completed an in-person or virtual visit within the past 6 months or has a future visit scheduled within the next 90 days with the authorizing provider s specialty.  Urgent care and e-visits do not quality as an office visit for this protocol.          Passed - Medication indicated for associated diagnosis     Medication is associated with one or more of the following diagnoses:   Allergies   Asthma   Atopic Dermatitis   Nasal Congestion   Nasal discharge   Rhinitis   Urticaria, chronic              sertraline (ZOLOFT) 100 MG tablet 90 tablet 3     Sig: Take 1 tablet (100 mg) by mouth daily       SSRIs Protocol Failed - 5/30/2024  9:23 AM        Failed - PHQ-9 score less than 5 in past 6 months     Please review last PHQ-9 score.           Passed - Medication is active on med list        Passed - Patient is age 18 or older        Passed - No active pregnancy on record        Passed - No positive pregnancy test in last 12 months        Passed - Recent (6 mo) or future (30 days) visit within the authorizing provider's specialty     Patient had office visit in the last 6 months or has a visit in the next 30 days with authorizing  "provider or within the authorizing provider's specialty.  See \"Patient Info\" tab in inbasket, or \"Choose Columns\" in Meds & Orders section of the refill encounter.                  LOV: 3/19/2024  Future Office visit:    Next 5 appointments (look out 90 days)      Jun 04, 2024  8:20 AM  (Arrive by 8:05 AM)  SHORT with Helen Mccord MD  [facility] ) [address]  [place] [address]  [phone]           NOV 5 2022 YEARLY PREVENTIVE VISIT (Yearly)  Last completed: Nov 5, 2021     Routing refill request to provider for review/approval.    Seema Pike RN  ....................  6/1/2024   12:14 PM    "

## 2024-12-18 ENCOUNTER — MYC MEDICAL ADVICE (OUTPATIENT)
Dept: FAMILY MEDICINE | Facility: OTHER | Age: 39
End: 2024-12-18
Payer: COMMERCIAL

## 2024-12-18 ASSESSMENT — ASTHMA QUESTIONNAIRES
ACT_TOTALSCORE: 18
QUESTION_3 LAST FOUR WEEKS HOW OFTEN DID YOUR ASTHMA SYMPTOMS (WHEEZING, COUGHING, SHORTNESS OF BREATH, CHEST TIGHTNESS OR PAIN) WAKE YOU UP AT NIGHT OR EARLIER THAN USUAL IN THE MORNING: ONCE A WEEK
QUESTION_2 LAST FOUR WEEKS HOW OFTEN HAVE YOU HAD SHORTNESS OF BREATH: ONCE OR TWICE A WEEK
ACT_TOTALSCORE: 18
QUESTION_4 LAST FOUR WEEKS HOW OFTEN HAVE YOU USED YOUR RESCUE INHALER OR NEBULIZER MEDICATION (SUCH AS ALBUTEROL): ONCE A WEEK OR LESS
QUESTION_1 LAST FOUR WEEKS HOW MUCH OF THE TIME DID YOUR ASTHMA KEEP YOU FROM GETTING AS MUCH DONE AT WORK, SCHOOL OR AT HOME: A LITTLE OF THE TIME
QUESTION_5 LAST FOUR WEEKS HOW WOULD YOU RATE YOUR ASTHMA CONTROL: SOMEWHAT CONTROLLED

## 2025-05-05 DIAGNOSIS — F41.9 ANXIETY AND DEPRESSION: ICD-10-CM

## 2025-05-05 DIAGNOSIS — F32.A ANXIETY AND DEPRESSION: ICD-10-CM

## 2025-05-05 DIAGNOSIS — J45.20 MILD INTERMITTENT ASTHMA WITHOUT COMPLICATION: ICD-10-CM

## 2025-05-08 RX ORDER — SERTRALINE HYDROCHLORIDE 100 MG/1
100 TABLET, FILM COATED ORAL DAILY
Qty: 90 TABLET | Refills: 0 | Status: SHIPPED | OUTPATIENT
Start: 2025-05-08

## 2025-05-08 RX ORDER — MONTELUKAST SODIUM 10 MG/1
10 TABLET ORAL AT BEDTIME
Qty: 90 TABLET | Refills: 0 | Status: SHIPPED | OUTPATIENT
Start: 2025-05-08

## 2025-05-08 NOTE — TELEPHONE ENCOUNTER
[facility] sent Rx request for the following:      Requested Prescriptions   Pending Prescriptions Disp Refills    sertraline (ZOLOFT) 50 MG tablet [Pharmacy Med Name: sertraline 50 mg tablet] 90 tablet 3     Sig: Take 1 tablet (50 mg) by mouth daily       SSRIs Protocol Failed - 5/8/2025  2:36 PM        Failed - PHQ-9 score less than 5 in past 6 months     Please review last PHQ-9 score.       1/22/2024    10:22 AM 2/29/2024     6:46 AM 3/18/2024     5:17 PM   PHQ-9 SCORE   PHQ-9 Total Score MyChart 10 (Moderate depression) 5 (Mild depression) 7 (Mild depression)   PHQ-9 Total Score 10 5 7             Failed - LYNDA-7 score of less than 5 in past 6 months.     Please review last LYNDA-7 score.       11/29/2023     2:31 PM 2/29/2024     6:46 AM 3/13/2024     7:15 PM   LYNDA-7 SCORE   Total Score 8 (mild anxiety) 4 (minimal anxiety) 5 (mild anxiety)   Total Score 8 4 5             Failed - Recent (12 mo) or future (90 days) visit within the authorizing provider's specialty     The patient must have completed an in-person or virtual visit within the past 12 months or has a future visit scheduled within the next 90 days with the authorizing provider s specialty.  Urgent care and e-visits do not qualify as an office visit for this protocol.         Last Prescription Date:   3/21/24  Last Fill Qty/Refills:         90, R-3    Last Office Visit:              3/19/24   Future Office visit:                Routing refill request to provider for review/approval because:  Patient needs to be seen because it has been more than 1 year since last office visit.  Message already sent to scheduling.  Chitra Kraus RN on 5/8/2025 at 2:38 PM

## 2025-05-08 NOTE — TELEPHONE ENCOUNTER
[facility] sent Rx request for the following:      Requested Prescriptions   Pending Prescriptions Disp Refills    sertraline (ZOLOFT) 100 MG tablet [Pharmacy Med Name: sertraline 100 mg tablet] 90 tablet 3     Sig: TAKE 1 TABLET BY MOUTH DAILY       SSRIs Protocol Failed - 5/8/2025  2:31 PM        Failed - PHQ-9 score less than 5 in past 6 months     Please review last PHQ-9 score.       1/22/2024    10:22 AM 2/29/2024     6:46 AM 3/18/2024     5:17 PM   PHQ-9 SCORE   PHQ-9 Total Score MyChart 10 (Moderate depression) 5 (Mild depression) 7 (Mild depression)   PHQ-9 Total Score 10 5 7             Failed - LYNDA-7 score of less than 5 in past 6 months.     Please review last LYNDA-7 score.       11/29/2023     2:31 PM 2/29/2024     6:46 AM 3/13/2024     7:15 PM   LYNDA-7 SCORE   Total Score 8 (mild anxiety) 4 (minimal anxiety) 5 (mild anxiety)   Total Score 8 4 5             Failed - Recent (12 mo) or future (90 days) visit within the authorizing provider's specialty     The patient must have completed an in-person or virtual visit within the past 12 months or has a future visit scheduled within the next 90 days with the authorizing provider s specialty.  Urgent care and e-visits do not qualify as an office visit for this protocol.     Last Prescription Date:   3/19/24  Last Fill Qty/Refills:         90, R-3            montelukast (SINGULAIR) 10 MG tablet [Pharmacy Med Name: montelukast 10 mg tablet] 90 tablet 3     Sig: Take 1 tablet (10 mg) by mouth at bedtime       Leukotriene Inhibitors Protocol Failed - 5/8/2025  2:31 PM        Failed - Asthma control assessment score within normal limits in last 6 months     Please review ACT score.           Failed - Recent (6 mo) or future (90 days) visit within the authorizing provider's specialty     The patient must have completed an in-person or virtual visit within the past 6 months or has a future visit scheduled within the next 90 days with the authorizing provider s specialty.   Urgent care and e-visits do not quality as an office visit for this protocol.         Last Prescription Date:   3/19/24  Last Fill Qty/Refills:         90, R-3    Last Office Visit:              3/19/24   Future Office visit:                Routing refill request to provider for review/approval because:  Patient needs to be seen because it has been more than 1 year since last office visit.  Will route to unit 4 scheduling to call patient and help assist in scheduling appointment.  Chitra Kraus RN on 5/8/2025 at 2:35 PM

## (undated) RX ORDER — KETOROLAC TROMETHAMINE 30 MG/ML
INJECTION, SOLUTION INTRAMUSCULAR; INTRAVENOUS
Status: DISPENSED
Start: 2020-09-12

## (undated) RX ORDER — FENTANYL CITRATE 50 UG/ML
INJECTION, SOLUTION INTRAMUSCULAR; INTRAVENOUS
Status: DISPENSED
Start: 2020-09-12

## (undated) RX ORDER — ACETAMINOPHEN 500 MG
TABLET ORAL
Status: DISPENSED
Start: 2022-11-30